# Patient Record
Sex: FEMALE | Race: BLACK OR AFRICAN AMERICAN | NOT HISPANIC OR LATINO | Employment: OTHER | URBAN - METROPOLITAN AREA
[De-identification: names, ages, dates, MRNs, and addresses within clinical notes are randomized per-mention and may not be internally consistent; named-entity substitution may affect disease eponyms.]

---

## 2017-03-29 PROBLEM — F41.9 ANXIETY: Status: ACTIVE | Noted: 2017-03-29

## 2017-03-29 PROBLEM — E11.9 DIABETES (HCC): Status: ACTIVE | Noted: 2017-03-29

## 2017-03-30 ENCOUNTER — HOSPITAL ENCOUNTER (OUTPATIENT)
Dept: GENERAL RADIOLOGY | Facility: HOSPITAL | Age: 62
Discharge: HOME | End: 2017-03-30
Attending: INTERNAL MEDICINE | Admitting: INTERNAL MEDICINE

## 2017-03-30 ENCOUNTER — OFFICE VISIT (OUTPATIENT)
Dept: INTERNAL MEDICINE | Facility: CLINIC | Age: 62
End: 2017-03-30

## 2017-03-30 VITALS
HEIGHT: 70 IN | OXYGEN SATURATION: 96 % | BODY MASS INDEX: 22.51 KG/M2 | SYSTOLIC BLOOD PRESSURE: 95 MMHG | TEMPERATURE: 98.6 F | HEART RATE: 54 BPM | WEIGHT: 157.25 LBS | DIASTOLIC BLOOD PRESSURE: 60 MMHG

## 2017-03-30 DIAGNOSIS — M25.551 PAIN OF RIGHT HIP JOINT: Primary | ICD-10-CM

## 2017-03-30 DIAGNOSIS — M25.551 PAIN OF RIGHT HIP JOINT: ICD-10-CM

## 2017-03-30 DIAGNOSIS — F51.01 PRIMARY INSOMNIA: ICD-10-CM

## 2017-03-30 DIAGNOSIS — R73.9 HYPERGLYCEMIA: Primary | ICD-10-CM

## 2017-03-30 DIAGNOSIS — R00.1 BRADYCARDIA: ICD-10-CM

## 2017-03-30 PROBLEM — E11.9 DIABETES (HCC): Status: RESOLVED | Noted: 2017-03-29 | Resolved: 2017-03-30

## 2017-03-30 PROCEDURE — 73502 X-RAY EXAM HIP UNI 2-3 VIEWS: CPT

## 2017-03-30 PROCEDURE — 93000 ELECTROCARDIOGRAM COMPLETE: CPT | Performed by: INTERNAL MEDICINE

## 2017-03-30 PROCEDURE — 99214 OFFICE O/P EST MOD 30 MIN: CPT | Performed by: INTERNAL MEDICINE

## 2017-03-30 NOTE — PROGRESS NOTES
Subjective  Andrea Soriano is a 61 y.o. female    HPI coming in for follow-up patient with a history of hyperglycemia in the past complains of insomnia is doing reasonably well with trazodone 100 mg daily at bedtime a higher dose gives her daytime somnolence. Has been complaining of right-sided hip pain for about 3 months now symptoms seem to have started after a fall around that time pain with excessive standing and while she is turning in the bed. She still manages to run 2-3 miles 3 days a week    The following portions of the patient's history were reviewed and updated as appropriate: allergies, current medications, past family history, past medical history, past social history, past surgical history, and problem list.     Review of Systems   Constitutional: Negative.  Negative for activity change, appetite change, fatigue and fever.   HENT: Negative for congestion, ear discharge, ear pain and trouble swallowing.    Eyes: Negative for photophobia and visual disturbance.   Respiratory: Negative for cough and shortness of breath.    Cardiovascular: Negative for chest pain and palpitations.   Gastrointestinal: Negative for abdominal distention, abdominal pain, constipation, diarrhea, nausea and vomiting.   Endocrine: Negative.    Genitourinary: Negative for dysuria, hematuria and urgency.   Musculoskeletal: Positive for arthralgias. Negative for back pain, joint swelling and myalgias.        Rt hip pain   Skin: Negative for color change and rash.   Allergic/Immunologic: Negative.    Neurological: Negative for dizziness, weakness, light-headedness and headaches.   Hematological: Negative for adenopathy. Does not bruise/bleed easily.   Psychiatric/Behavioral: Positive for sleep disturbance. Negative for agitation, confusion and dysphoric mood. The patient is nervous/anxious.        Vitals:    03/30/17 1321   BP: 95/60   Pulse: 54   Temp: 98.6 °F (37 °C)   SpO2: 96%       Objective  Physical Exam   Constitutional: She  is oriented to person, place, and time. She appears well-developed and well-nourished. No distress.   HENT:   Nose: Nose normal.   Mouth/Throat: Oropharynx is clear and moist.   Eyes: Conjunctivae and EOM are normal. No scleral icterus.   Neck: No tracheal deviation present. No thyromegaly present.   Cardiovascular: Regular rhythm.  Exam reveals no friction rub.    No murmur heard.  Pulmonary/Chest: No respiratory distress. She has no wheezes. She has no rales.   Abdominal: Soft. She exhibits no distension and no mass. There is no tenderness. There is no guarding.   Musculoskeletal: Normal range of motion. She exhibits tenderness. She exhibits no deformity.   Painful rt hip int. rotation   Lymphadenopathy:     She has no cervical adenopathy.   Neurological: She is alert and oriented to person, place, and time. She has normal reflexes. No cranial nerve deficit. Coordination normal.   Skin: Skin is warm and dry. No rash noted. No erythema.   Psychiatric: She has a normal mood and affect. Her behavior is normal. Judgment and thought content normal.       Diagnoses and all orders for this visit:    Hyperglycemia discussed dietary restrictions check A1c    Primary insomnia counseled about sleep hygiene continue with trazodone    Pain of right hip joint suspect DJD of her hip joint check x-ray. Ortho [facility] referral if needed    Bradycardia noted on routine evaluation without lightheadedness dizziness no history of syncope EKG done today. Findings below. Since she is asymptomatic will continue to observe    ECG 12 Lead  Date/Time: 3/30/2017 5:37 PM  Performed by: MARCOS PIZANO  Authorized by: MARCOS PIZANO   Rhythm: sinus rhythm and sinus bradycardia  Rate: bradycardic  Conduction: conduction normal  ST Segments: ST segments normal  T Waves: T waves normal  Other: no other findings

## 2017-03-31 LAB
ALBUMIN SERPL-MCNC: 4.2 G/DL (ref 3.5–5)
ALBUMIN/GLOB SERPL: 1.3 G/DL (ref 1–2)
ALP SERPL-CCNC: 88 U/L (ref 38–126)
ALT SERPL-CCNC: 29 U/L (ref 13–69)
AST SERPL-CCNC: 27 U/L (ref 15–46)
BILIRUB SERPL-MCNC: 0.7 MG/DL (ref 0.2–1.3)
BUN SERPL-MCNC: 15 MG/DL (ref 7–20)
BUN/CREAT SERPL: 18.8 (ref 7.1–23.5)
CALCIUM SERPL-MCNC: 9.5 MG/DL (ref 8.4–10.2)
CHLORIDE SERPL-SCNC: 104 MMOL/L (ref 98–107)
CO2 SERPL-SCNC: 29 MMOL/L (ref 26–30)
CREAT SERPL-MCNC: 0.8 MG/DL (ref 0.6–1.3)
GLOBULIN SER CALC-MCNC: 3.3 GM/DL
GLUCOSE SERPL-MCNC: 96 MG/DL (ref 74–98)
HBA1C MFR BLD: 5.9 %
POTASSIUM SERPL-SCNC: 4.2 MMOL/L (ref 3.5–5.1)
PROT SERPL-MCNC: 7.5 G/DL (ref 6.3–8.2)
SODIUM SERPL-SCNC: 141 MMOL/L (ref 137–145)
TSH SERPL DL<=0.005 MIU/L-ACNC: 0.91 MIU/ML (ref 0.47–4.68)

## 2017-04-10 ENCOUNTER — OFFICE VISIT (OUTPATIENT)
Dept: ORTHOPEDIC SURGERY | Facility: CLINIC | Age: 62
End: 2017-04-10

## 2017-04-10 VITALS — BODY MASS INDEX: 22.48 KG/M2 | HEIGHT: 70 IN | RESPIRATION RATE: 17 BRPM | WEIGHT: 157 LBS

## 2017-04-10 DIAGNOSIS — M16.11 PRIMARY OSTEOARTHRITIS OF RIGHT HIP: Primary | ICD-10-CM

## 2017-04-10 PROCEDURE — 99202 OFFICE O/P NEW SF 15 MIN: CPT | Performed by: ORTHOPAEDIC SURGERY

## 2017-04-10 NOTE — PROGRESS NOTES
Subjective   Patient ID: Andrea Soriano is a 61 y.o. right hand dominant female is being seen for orthopaedic evaluation today for right hip pain. Pain and Consult of the Right Hip (Patient is being seen today at the request of Dr. Carpio.)         Pain   This is a chronic ( And she also reports two episodes of falling while jogging in August then October of 2016.) problem. The current episode started more than 1 year ago. The problem occurs intermittently. The problem has been waxing and waning (although for the past two weeks her hip has been improving..). Associated symptoms include arthralgias. Pertinent negatives include no abdominal pain, chest pain, fever, joint swelling or rash. The symptoms are aggravated by stress (jogging along rocks on her path, postional pain lying on the right side.). She has tried heat and rest (epsom salts,) for the symptoms. The treatment provided moderate (And she continues able to jog 5 miles 5 times a week.) relief.       Pain Score: 5  Pain Location: Hip  Pain Orientation: Right  Pain Descriptors: Aching  Pain Frequency: Intermittent  Date Pain First Started:  (started last year )  Aggravating Factors:  (laying on the right side)  Pain Intervention(s): Heat applied (soaking in the tub)  Result of Injury: Yes (she states she had a bad fall jogging)  Work-Related Injury: No    Past Medical History:   Diagnosis Date   • Eczema    • Hemorrhoid    • Insomnia         Past Surgical History:   Procedure Laterality Date   • HYSTERECTOMY         Family History   Problem Relation Age of Onset   • Family history unknown: Yes       Social History     Social History   • Marital status:      Spouse name: N/A   • Number of children: N/A   • Years of education: N/A     Occupational History   • retired       Social History Main Topics   • Smoking status: Never Smoker   • Smokeless tobacco: Not on file   • Alcohol use Yes      Comment: occassional   • Drug use: No   • Sexual  "activity: Defer     Other Topics Concern   • Not on file     Social History Narrative       No Known Allergies    Review of Systems   Constitutional: Negative for fever.   HENT: Negative for voice change.    Eyes: Negative for visual disturbance.   Respiratory: Negative for shortness of breath.    Cardiovascular: Negative for chest pain.   Gastrointestinal: Negative for abdominal distention and abdominal pain.   Genitourinary: Negative for dysuria.   Musculoskeletal: Positive for arthralgias. Negative for gait problem and joint swelling.   Skin: Negative for rash.   Neurological: Negative for speech difficulty.   Hematological: Does not bruise/bleed easily.   Psychiatric/Behavioral: Negative for confusion.       Objective   Resp 17  Ht 69.5\" (176.5 cm)  Wt 157 lb (71.2 kg)  BMI 22.85 kg/m2   Physical Exam   Constitutional: She appears well-developed. No distress.   Neurological: She is alert. Gait normal.   Skin: Skin is warm and dry. No rash noted. No erythema.   Psychiatric: She has a normal mood and affect. Her speech is normal.   Vitals reviewed.    Right Hip Exam     Tenderness   The patient is experiencing tenderness in the anterior and posterior.    Range of Motion   Flexion: 100   Internal Rotation: 10   External Rotation: 20   Abduction: 30     Muscle Strength   Abduction: 5/5   Adduction: 5/5   Flexion: 5/5     Tests   JEFF: negative    Other   Erythema: absent  Sensation: normal  Pulse: present      Back Exam     Tenderness   The patient is experiencing no tenderness.     Muscle Strength   Right Quadriceps:  5/5   Left Quadriceps:  5/5   Right Hamstrings:  5/5   Left Hamstrings:  5/5     Tests   Straight leg raise right: negative  Straight leg raise left: negative        Extremity DVT signs are Negative on physical exam with negative Atiilo sign, with no calf pain, with no palpable cords and with no skin tone change   Neurologic Exam     Mental Status   Attention: normal.   Speech: speech is normal "   Level of consciousness: alert  Knowledge: good.     Motor Exam   Overall muscle tone: normal    Strength   Right quadriceps: 5/5  Left quadriceps: 5/5  Right hamstrin/5  Left hamstrin/5    Gait, Coordination, and Reflexes     Gait  Gait: normal     Ortho Exam     Assessment/Plan   Independent Review of Radiographic Studies:    Indication to evaluate right hip pain and with prior comparison views, shows no acute fracture or disclocation evident.  Indication to evaluate right hip joint condition, and compared with prior images, shows evident chronic advanced osteoarthritis.  No major changes from prior  2012 hip radiographs.  Laboratory and Other Studies:  No new results reviewed today.   Medical Decision Making:    Ongoing with residual symptoms.  Continue current management and any additional treatments and workup as outlined in plan.    Procedures  [x] No procedures were performed in office today.     Andrea was seen today for pain and consult.    Diagnoses and all orders for this visit:    Primary osteoarthritis of right hip       Regular exercise as tolerated  Orthopedic activities reviewed and patient expressed appreciation  Discussion of orthopedic goals  Risk, benefits, and merits of treatment alternatives reviewed with the patient and questions answered   We reviewed exercise and jogging methods and surfaces to decrease shock on the hip and joints.  Discussed options including elective right hip joint corticosteroid injeciton under flouroscopy and for intractable pain and functional limitations, the option of left hip replacement.    Recommendations/Plan:  Exercise, medications, injections, other patient advice, and return appointment as noted.  Brace: No brace was given at today's visit  Referral: No referrals made at today's visit  Test/Studies: No additional studies ordered.  Surgery: No surgery proposed at this visit. and For intractable painful limiting condition, patient to consider elective  surgical options.  Work/Activity Status: May perform usual activities as tolerated  Patient is encouraged to call or return for any issues or concerns.    Return if symptoms worsen or fail to improve.  Patient agreeable to call or return sooner for any concerns.

## 2017-04-27 ENCOUNTER — TELEPHONE (OUTPATIENT)
Dept: INTERNAL MEDICINE | Facility: CLINIC | Age: 62
End: 2017-04-27

## 2017-04-27 NOTE — TELEPHONE ENCOUNTER
----- Message from [place] sent at 4/27/2017 10:15 AM EDT -----  Contact: Patient  Patient cancelled her appt for this morning because she saw a different Dr about her hip. She is getting cortisone injections in it and she wants to know if she should come see Dr. Carpio after she gets the injections.    She'd like a callback.

## 2017-05-19 ENCOUNTER — HOSPITAL ENCOUNTER (OUTPATIENT)
Dept: GENERAL RADIOLOGY | Facility: HOSPITAL | Age: 62
Discharge: HOME | End: 2017-05-19
Admitting: PHYSICIAN ASSISTANT

## 2017-05-19 PROCEDURE — 77002 NEEDLE LOCALIZATION BY XRAY: CPT

## 2017-05-19 PROCEDURE — 20610 DRAIN/INJ JOINT/BURSA W/O US: CPT | Performed by: ORTHOPAEDIC SURGERY

## 2017-05-19 PROCEDURE — 77002 NEEDLE LOCALIZATION BY XRAY: CPT | Performed by: ORTHOPAEDIC SURGERY

## 2017-05-19 PROCEDURE — 25010000002 METHYLPREDNISOLONE PER 80 MG: Performed by: ORTHOPAEDIC SURGERY

## 2017-05-19 RX ORDER — LIDOCAINE HYDROCHLORIDE 10 MG/ML
20 INJECTION, SOLUTION INFILTRATION; PERINEURAL ONCE
Status: COMPLETED | OUTPATIENT
Start: 2017-05-19 | End: 2017-05-19

## 2017-05-19 RX ORDER — METHYLPREDNISOLONE ACETATE 80 MG/ML
80 INJECTION, SUSPENSION INTRA-ARTICULAR; INTRALESIONAL; INTRAMUSCULAR; SOFT TISSUE ONCE
Status: COMPLETED | OUTPATIENT
Start: 2017-05-19 | End: 2017-05-19

## 2017-05-19 RX ADMIN — LIDOCAINE HYDROCHLORIDE 9 ML: 10 INJECTION, SOLUTION INFILTRATION; PERINEURAL at 16:15

## 2017-05-19 RX ADMIN — METHYLPREDNISOLONE ACETATE 80 MG: 80 INJECTION, SUSPENSION INTRA-ARTICULAR; INTRALESIONAL; INTRAMUSCULAR; SOFT TISSUE at 15:40

## 2017-06-05 ENCOUNTER — OFFICE VISIT (OUTPATIENT)
Dept: INTERNAL MEDICINE | Facility: CLINIC | Age: 62
End: 2017-06-05

## 2017-06-05 VITALS
TEMPERATURE: 98.2 F | SYSTOLIC BLOOD PRESSURE: 105 MMHG | OXYGEN SATURATION: 98 % | DIASTOLIC BLOOD PRESSURE: 70 MMHG | BODY MASS INDEX: 22.78 KG/M2 | HEART RATE: 48 BPM | HEIGHT: 70 IN | WEIGHT: 159.13 LBS

## 2017-06-05 DIAGNOSIS — M25.512 NONTRAUMATIC SHOULDER PAIN, LEFT: Primary | ICD-10-CM

## 2017-06-05 DIAGNOSIS — F51.01 PRIMARY INSOMNIA: ICD-10-CM

## 2017-06-05 DIAGNOSIS — G25.89 SCAPULAR DYSKINESIS: ICD-10-CM

## 2017-06-05 PROCEDURE — 99213 OFFICE O/P EST LOW 20 MIN: CPT | Performed by: INTERNAL MEDICINE

## 2017-06-05 RX ORDER — CYCLOBENZAPRINE HCL 5 MG
5 TABLET ORAL 3 TIMES DAILY PRN
Qty: 10 TABLET | Refills: 0 | Status: SHIPPED | OUTPATIENT
Start: 2017-06-05 | End: 2017-06-16 | Stop reason: SDUPTHER

## 2017-06-05 RX ORDER — NAPROXEN 500 MG/1
500 TABLET ORAL 2 TIMES DAILY WITH MEALS
Qty: 20 TABLET | Refills: 0 | Status: SHIPPED | OUTPATIENT
Start: 2017-06-05 | End: 2017-06-16 | Stop reason: SDUPTHER

## 2017-06-05 NOTE — PROGRESS NOTES
Subjective   Andrea Soriano is a 61 y.o. female.     History of Present Illness  Here for left, non-radiating, shoulder pain which she describes as feeling like a muscle knot.  The pain is located on the medial aspect of her left scapula. The pain began 3 days ago upon waking and she thought it might be a result of the way she slept.  The pain is worsened by stretching the area or by deep breathing.  The pain is somewhat relieved by resting the area.  She has tried NSAIDs and soaking in warm water with minimal relief.  She denies injury, chest pain, dyspnea, cough, rash or changes in the skin.  She reports being very active, running 6 miles 5 days a week, and does not recall lifting anything that may have aggravated her muscles.     The following portions of the patient's history were reviewed and updated as appropriate: allergies, current medications, past family history, past medical history, past social history, past surgical history and problem list.    Review of Systems   Constitutional: Negative.  Negative for activity change, appetite change, fatigue, fever and unexpected weight change.   HENT: Negative for congestion, ear discharge, ear pain and trouble swallowing.    Eyes: Negative for photophobia and visual disturbance.   Respiratory: Negative for cough, chest tightness, shortness of breath and wheezing.    Cardiovascular: Negative for chest pain, palpitations and leg swelling.   Gastrointestinal: Negative for abdominal distention, abdominal pain, constipation, diarrhea, nausea and vomiting.   Endocrine: Negative.    Genitourinary: Negative for dysuria, hematuria and urgency.   Musculoskeletal: Negative for arthralgias, joint swelling and myalgias.        Left shoulder pain   Skin: Negative for color change and rash.   Allergic/Immunologic: Negative.    Neurological: Negative for dizziness, weakness, light-headedness and headaches.   Hematological: Negative for adenopathy. Does not bruise/bleed easily.    Psychiatric/Behavioral: Positive for sleep disturbance. Negative for agitation, confusion and dysphoric mood. The patient is not nervous/anxious.        Objective   Physical Exam   Constitutional: She is oriented to person, place, and time. She appears well-developed and well-nourished. No distress.   HENT:   Head: Normocephalic.   Nose: Nose normal.   Mouth/Throat: Oropharynx is clear and moist.   Eyes: Conjunctivae and EOM are normal. No scleral icterus.   Neck: Normal range of motion. Neck supple. No tracheal deviation present. No thyromegaly present.   Cardiovascular: Regular rhythm, normal heart sounds and intact distal pulses.  Exam reveals no friction rub.    No murmur heard.  Bradycardic    Pulmonary/Chest: Effort normal and breath sounds normal. No respiratory distress. She has no wheezes. She has no rales.   Abdominal: Soft. Bowel sounds are normal. She exhibits no distension and no mass. There is no tenderness. There is no guarding.   Musculoskeletal: Normal range of motion. She exhibits tenderness. She exhibits no deformity.   Shoulder ROM intact bilaterally.  Tender to palpation of medial aspect of the left scapula and over the area of T3   Lymphadenopathy:     She has no cervical adenopathy.   Neurological: She is alert and oriented to person, place, and time. She has normal reflexes. No cranial nerve deficit. Coordination normal.   Skin: Skin is warm and dry. No rash noted. No erythema.   No rashes or lesions noted around the area of her left scapula    Psychiatric: She has a normal mood and affect. Her behavior is normal. Judgment and thought content normal.   Nursing note and vitals reviewed.      Assessment/Plan     Nontraumatic shoulder pain, left - Likely due to muscle strain.  Will initiate regular use of naproxen and flexeril as needed.  Continue with heat and stretching.  Return to clinic or call if symptoms persist or worsen or if rash develops.    -     naproxen (NAPROSYN) 500 MG tablet;  Take 1 tablet by mouth 2 (Two) Times a Day With Meals.  -     cyclobenzaprine (FLEXERIL) 5 MG tablet; Take 1 tablet by mouth 3 (Three) Times a Day As Needed for Muscle Spasms.    Scapular dyskinesis - Will initiate regular use of naproxen and flexeril as needed.  Continue with heat and stretching.  Return to clinic or call if symptoms persist or worsen or if rash develops.    -     naproxen (NAPROSYN) 500 MG tablet; Take 1 tablet by mouth 2 (Two) Times a Day With Meals.  -     cyclobenzaprine (FLEXERIL) 5 MG tablet; Take 1 tablet by mouth 3 (Three) Times a Day As Needed for Muscle Spasms.    Primary insomnia - Well controlled with nightly trazodone.  Continue with good sleep hygiene and trazodone.           This encounter was scribed by CARLITA Armstrong, as dictated by my teaching physician, Dr. Lonnie Carpio, in his presence.   1.  This note accurately reflects work and decisions made by me.

## 2017-06-16 ENCOUNTER — TELEPHONE (OUTPATIENT)
Dept: INTERNAL MEDICINE | Facility: CLINIC | Age: 62
End: 2017-06-16

## 2017-06-16 RX ORDER — CYCLOBENZAPRINE HCL 5 MG
5 TABLET ORAL 3 TIMES DAILY PRN
Qty: 10 TABLET | Refills: 0 | Status: SHIPPED | OUTPATIENT
Start: 2017-06-16 | End: 2018-01-22 | Stop reason: SDUPTHER

## 2017-06-16 RX ORDER — NAPROXEN 500 MG/1
500 TABLET ORAL 2 TIMES DAILY WITH MEALS
Qty: 20 TABLET | Refills: 0 | Status: SHIPPED | OUTPATIENT
Start: 2017-06-16 | End: 2018-01-22 | Stop reason: SDUPTHER

## 2017-06-16 NOTE — TELEPHONE ENCOUNTER
Patient is still having back pain and is requesting a refill of both medicines Dr. Carpio prescribed her.

## 2018-01-22 ENCOUNTER — OFFICE VISIT (OUTPATIENT)
Dept: INTERNAL MEDICINE | Facility: CLINIC | Age: 63
End: 2018-01-22

## 2018-01-22 VITALS
HEIGHT: 69 IN | HEART RATE: 58 BPM | TEMPERATURE: 96.4 F | BODY MASS INDEX: 24.73 KG/M2 | OXYGEN SATURATION: 98 % | SYSTOLIC BLOOD PRESSURE: 120 MMHG | DIASTOLIC BLOOD PRESSURE: 60 MMHG | WEIGHT: 167 LBS

## 2018-01-22 DIAGNOSIS — M54.50 ACUTE MIDLINE LOW BACK PAIN WITHOUT SCIATICA: Primary | ICD-10-CM

## 2018-01-22 PROCEDURE — 99213 OFFICE O/P EST LOW 20 MIN: CPT | Performed by: PHYSICIAN ASSISTANT

## 2018-01-22 RX ORDER — CYCLOBENZAPRINE HCL 5 MG
5 TABLET ORAL 3 TIMES DAILY PRN
Qty: 15 TABLET | Refills: 0 | Status: SHIPPED | OUTPATIENT
Start: 2018-01-22 | End: 2018-03-26

## 2018-01-22 RX ORDER — NAPROXEN 500 MG/1
500 TABLET ORAL 2 TIMES DAILY WITH MEALS
Qty: 20 TABLET | Refills: 0 | Status: SHIPPED | OUTPATIENT
Start: 2018-01-22 | End: 2018-01-26 | Stop reason: ALTCHOICE

## 2018-01-22 NOTE — PROGRESS NOTES
"Subjective     Chief Complaint: back pain    History of Present Illness     Andrea Soriano is a 62 y.o. female presenting complaints of lower back pain, worse on her right side ×4-5 days.  States she may have overdid it several days ago while cleaning, vacuuming states she lifted a heavy bucket of water and may have strained her back.  She's tried alternating Tylenol and Motrin at home with little relief.  She is also tried sleeping with a heating pad, icing the area, Epsom salt.  Hurts worse with twisting. States she is still running several miles a day.  Denies numbness, tingling, burning, denies pain radiating down leg. Denies issues with bowel or bladder.    She also has chronic right hip pain for which she receives steroid injections.  She is unsure if she strained her back while cleaning or if this may be related to the hip, as she is due for another hip injection soon.    The following portions of the patient's history were reviewed and updated as appropriate: current medications, allergies, PMH.    Review of Systems   Musculoskeletal: Positive for arthralgias, back pain and myalgias. Negative for joint swelling, neck pain and neck stiffness.     Objective     Vitals:    01/22/18 1402   BP: 120/60   Pulse: 58   Temp: 96.4 °F (35.8 °C)   SpO2: 98%   Weight: 75.8 kg (167 lb)   Height: 176.5 cm (69.49\")       Physical Exam   Constitutional: She appears well-developed and well-nourished.   Musculoskeletal:        Right hip: She exhibits decreased range of motion, decreased strength and tenderness. She exhibits no deformity.        Lumbar back: She exhibits decreased range of motion, tenderness and pain. She exhibits no deformity.      Assessment/Plan     Diagnoses and all orders for this visit:    Acute midline low back pain without sciatica  -     naproxen (NAPROSYN) 500 MG tablet; Take 1 tablet by mouth 2 (Two) Times a Day With Meals.  -     cyclobenzaprine (FLEXERIL) 5 MG tablet; Take 1 tablet by mouth 3 " (Three) Times a Day As Needed for Muscle Spasms.    Rest, naproxen as needed with food, may use flexeril to help her rest at night.  RTC if pain does not resolve in 2 weeks.    Tricia Hanks PA-C  01/22/2018         Please note that portions of this note were completed with a voice recognition program. Efforts were made to edit dictation, but occasionally words are mistranscribed.

## 2018-01-25 DIAGNOSIS — M25.551 RIGHT HIP PAIN: Primary | ICD-10-CM

## 2018-03-26 ENCOUNTER — OFFICE VISIT (OUTPATIENT)
Dept: INTERNAL MEDICINE | Facility: CLINIC | Age: 63
End: 2018-03-26

## 2018-03-26 VITALS
HEART RATE: 58 BPM | OXYGEN SATURATION: 99 % | BODY MASS INDEX: 25.03 KG/M2 | WEIGHT: 169 LBS | DIASTOLIC BLOOD PRESSURE: 70 MMHG | TEMPERATURE: 97.9 F | HEIGHT: 69 IN | SYSTOLIC BLOOD PRESSURE: 130 MMHG

## 2018-03-26 DIAGNOSIS — M25.551 PAIN OF RIGHT HIP JOINT: ICD-10-CM

## 2018-03-26 DIAGNOSIS — F51.01 PRIMARY INSOMNIA: ICD-10-CM

## 2018-03-26 DIAGNOSIS — E55.9 VITAMIN D DEFICIENCY: ICD-10-CM

## 2018-03-26 DIAGNOSIS — M54.50 CHRONIC MIDLINE LOW BACK PAIN WITHOUT SCIATICA: Primary | ICD-10-CM

## 2018-03-26 DIAGNOSIS — G89.29 CHRONIC MIDLINE LOW BACK PAIN WITHOUT SCIATICA: Primary | ICD-10-CM

## 2018-03-26 LAB
25(OH)D3+25(OH)D2 SERPL-MCNC: 27.9 NG/ML
ALBUMIN SERPL-MCNC: 4.2 G/DL (ref 3.5–5)
ALBUMIN/GLOB SERPL: 1.3 G/DL (ref 1–2)
ALP SERPL-CCNC: 102 U/L (ref 38–126)
ALT SERPL-CCNC: 40 U/L (ref 13–69)
AST SERPL-CCNC: 25 U/L (ref 15–46)
BILIRUB SERPL-MCNC: 0.4 MG/DL (ref 0.2–1.3)
BUN SERPL-MCNC: 18 MG/DL (ref 7–20)
BUN/CREAT SERPL: 25.7 (ref 7.1–23.5)
CALCIUM SERPL-MCNC: 10.1 MG/DL (ref 8.4–10.2)
CHLORIDE SERPL-SCNC: 104 MMOL/L (ref 98–107)
CO2 SERPL-SCNC: 27 MMOL/L (ref 26–30)
CREAT SERPL-MCNC: 0.7 MG/DL (ref 0.6–1.3)
GFR SERPLBLD CREATININE-BSD FMLA CKD-EPI: 103 ML/MIN/1.73
GFR SERPLBLD CREATININE-BSD FMLA CKD-EPI: 85 ML/MIN/1.73
GLOBULIN SER CALC-MCNC: 3.3 GM/DL
GLUCOSE SERPL-MCNC: 130 MG/DL (ref 74–98)
POTASSIUM SERPL-SCNC: 4.4 MMOL/L (ref 3.5–5.1)
PROT SERPL-MCNC: 7.5 G/DL (ref 6.3–8.2)
SODIUM SERPL-SCNC: 145 MMOL/L (ref 137–145)

## 2018-03-26 PROCEDURE — 99214 OFFICE O/P EST MOD 30 MIN: CPT | Performed by: INTERNAL MEDICINE

## 2018-03-26 NOTE — PROGRESS NOTES
"Subjective  Andrea Soriano is a 62 y.o. female    HPI coming in for follow-up patient with right hip pain workup in the past has shown evidence of severe DJD has complains of low back pain without radiation down her legs complains of insomnia. Social drinker no tobacco use no new trauma uses NSAIDs on a when necessary basis    The following portions of the patient's history were reviewed and updated as appropriate: allergies, current medications, past family history, past medical history, past social history, past surgical history, and problem list.     Review of Systems   Constitutional: Negative.  Negative for activity change, appetite change, fatigue and fever.   HENT: Negative for congestion, ear discharge, ear pain and trouble swallowing.    Eyes: Negative for photophobia and visual disturbance.   Respiratory: Negative for cough and shortness of breath.    Cardiovascular: Negative for chest pain and palpitations.   Gastrointestinal: Negative for abdominal distention, abdominal pain, constipation, diarrhea, nausea and vomiting.   Endocrine: Negative.    Genitourinary: Negative for dysuria, hematuria and urgency.   Musculoskeletal: Positive for arthralgias. Negative for back pain, joint swelling and myalgias.   Skin: Negative for color change and rash.   Allergic/Immunologic: Negative.    Neurological: Negative for dizziness, weakness, light-headedness and headaches.   Hematological: Negative for adenopathy. Does not bruise/bleed easily.   Psychiatric/Behavioral: Positive for sleep disturbance. Negative for agitation, confusion and dysphoric mood. The patient is not nervous/anxious.        Visit Vitals  /70   Pulse 58   Temp 97.9 °F (36.6 °C)   Ht 176.5 cm (69.49\")   Wt 76.7 kg (169 lb)   SpO2 99%   BMI 24.61 kg/m²       Objective  Physical Exam   Constitutional: She is oriented to person, place, and time. She appears well-developed and well-nourished. No distress.   HENT:   Nose: Nose normal. "   Mouth/Throat: Oropharynx is clear and moist.   Eyes: Conjunctivae and EOM are normal. No scleral icterus.   Neck: No tracheal deviation present. No thyromegaly present.   Cardiovascular: Normal rate and regular rhythm.  Exam reveals no friction rub.    No murmur heard.  Pulmonary/Chest: No respiratory distress. She has no wheezes. She has no rales.   Abdominal: Soft. She exhibits no distension and no mass. There is no tenderness. There is no guarding.   Musculoskeletal: Normal range of motion. She exhibits no deformity.   Lymphadenopathy:     She has no cervical adenopathy.   Neurological: She is alert and oriented to person, place, and time. She has normal reflexes. No cranial nerve deficit. Coordination normal.   Skin: Skin is warm and dry. No rash noted. No erythema.   Psychiatric: She has a normal mood and affect. Her behavior is normal. Judgment and thought content normal.       Diagnoses and all orders for this visit:    Chronic midline low back pain without sciatica stable continue with home exercises    Primary insomnia. Stable on trazodone at nighttime counseled about sleep hygiene    Pain of right hip joint. X-ray report reviewed discussed with patient may need arthroplasty in the future currently continues to run up to 5 miles a day has occasional pain at nighttime continue with conservative measures with meloxicam

## 2018-05-30 DIAGNOSIS — M16.11 PRIMARY OSTEOARTHRITIS OF RIGHT HIP: ICD-10-CM

## 2018-05-30 RX ORDER — MELOXICAM 15 MG/1
15 TABLET ORAL DAILY
Qty: 30 TABLET | Refills: 1 | Status: SHIPPED | OUTPATIENT
Start: 2018-05-30 | End: 2018-08-08 | Stop reason: SDUPTHER

## 2018-08-08 DIAGNOSIS — M16.11 PRIMARY OSTEOARTHRITIS OF RIGHT HIP: ICD-10-CM

## 2018-08-08 RX ORDER — MELOXICAM 15 MG/1
TABLET ORAL
Qty: 30 TABLET | Refills: 1 | Status: SHIPPED | OUTPATIENT
Start: 2018-08-08 | End: 2018-10-18 | Stop reason: SDUPTHER

## 2018-08-29 RX ORDER — NAPROXEN 500 MG/1
TABLET ORAL
Qty: 20 TABLET | Refills: 2 | Status: SHIPPED | OUTPATIENT
Start: 2018-08-29 | End: 2018-10-01

## 2018-10-01 ENCOUNTER — OFFICE VISIT (OUTPATIENT)
Dept: INTERNAL MEDICINE | Facility: CLINIC | Age: 63
End: 2018-10-01

## 2018-10-01 VITALS
WEIGHT: 160 LBS | OXYGEN SATURATION: 98 % | TEMPERATURE: 97.1 F | SYSTOLIC BLOOD PRESSURE: 160 MMHG | BODY MASS INDEX: 23.7 KG/M2 | HEIGHT: 69 IN | HEART RATE: 50 BPM | DIASTOLIC BLOOD PRESSURE: 70 MMHG

## 2018-10-01 DIAGNOSIS — M25.511 CHRONIC RIGHT SHOULDER PAIN: ICD-10-CM

## 2018-10-01 DIAGNOSIS — Z23 NEED FOR VACCINATION: Primary | ICD-10-CM

## 2018-10-01 DIAGNOSIS — G89.29 CHRONIC RIGHT SHOULDER PAIN: ICD-10-CM

## 2018-10-01 DIAGNOSIS — F51.01 PRIMARY INSOMNIA: ICD-10-CM

## 2018-10-01 DIAGNOSIS — I10 HTN (HYPERTENSION), BENIGN: ICD-10-CM

## 2018-10-01 PROCEDURE — 99214 OFFICE O/P EST MOD 30 MIN: CPT | Performed by: INTERNAL MEDICINE

## 2018-10-01 PROCEDURE — 90674 CCIIV4 VAC NO PRSV 0.5 ML IM: CPT | Performed by: INTERNAL MEDICINE

## 2018-10-01 PROCEDURE — 90471 IMMUNIZATION ADMIN: CPT | Performed by: INTERNAL MEDICINE

## 2018-10-01 RX ORDER — HYDROCHLOROTHIAZIDE 12.5 MG/1
12.5 CAPSULE, GELATIN COATED ORAL DAILY
Qty: 90 CAPSULE | Refills: 3 | Status: SHIPPED | OUTPATIENT
Start: 2018-10-01 | End: 2019-12-02 | Stop reason: SDUPTHER

## 2018-10-01 NOTE — PROGRESS NOTES
"Subjective  Andrea Woody is a 63 y.o. female    HPI coming in for follow-up has hypertension complains of right shoulder discomfort especially with overhead activities no new trauma discomfort ongoing for many months now. Denies upper extremity weakness or numbness has complains of anxiety with difficulty staying asleep. Denies significant alcohol use exercises regularly    The following portions of the patient's history were reviewed and updated as appropriate: allergies, current medications, past family history, past medical history, past social history, past surgical history, and problem list.     Review of Systems   Constitutional: Negative.  Negative for activity change, appetite change, fatigue and fever.   HENT: Negative for congestion, ear discharge, ear pain and trouble swallowing.    Eyes: Negative for photophobia and visual disturbance.   Respiratory: Negative for cough and shortness of breath.    Cardiovascular: Negative for chest pain and palpitations.   Gastrointestinal: Negative for abdominal distention, abdominal pain, constipation, diarrhea, nausea and vomiting.   Endocrine: Negative.    Genitourinary: Negative for dysuria, hematuria and urgency.   Musculoskeletal: Positive for arthralgias. Negative for back pain, joint swelling and myalgias.        Rt. Shoulder pain   Skin: Negative for color change and rash.   Allergic/Immunologic: Negative.    Neurological: Negative for dizziness, weakness, light-headedness and headaches.   Hematological: Negative for adenopathy. Does not bruise/bleed easily.   Psychiatric/Behavioral: Negative for agitation, confusion and dysphoric mood. The patient is not nervous/anxious.        Visit Vitals  /70   Pulse 50   Temp 97.1 °F (36.2 °C)   Ht 176.5 cm (69.49\")   Wt 72.6 kg (160 lb)   SpO2 98%   BMI 23.30 kg/m²       Objective  Physical Exam   Constitutional: She is oriented to person, place, and time. She appears well-developed and well-nourished. No distress. "   HENT:   Nose: Nose normal.   Mouth/Throat: Oropharynx is clear and moist.   Eyes: Conjunctivae and EOM are normal. No scleral icterus.   Neck: No tracheal deviation present. No thyromegaly present.   Cardiovascular: Normal rate and regular rhythm.  Exam reveals no friction rub.    No murmur heard.  Pulmonary/Chest: No respiratory distress. She has no wheezes. She has no rales.   Abdominal: Soft. She exhibits no distension and no mass. There is no tenderness. There is no guarding.   Musculoskeletal: Normal range of motion. She exhibits no deformity.   Rt shoulder painful extension, abduction   Lymphadenopathy:     She has no cervical adenopathy.   Neurological: She is alert and oriented to person, place, and time. She has normal reflexes. No cranial nerve deficit. Coordination normal.   Skin: Skin is warm and dry. No rash noted. No erythema.   Psychiatric: She has a normal mood and affect. Her behavior is normal. Judgment and thought content normal.       Diagnoses and all orders for this visit:    Need for vaccination  -     Flucelvax Quad (Vial) =>4 yrs ([id number])    HTN (hypertension), benign stable with current meds and low-salt diet    Chronic right shoulder pain discussed home exercises PT referral also made suspect impingement syndrome    Primary insomnia counseled about sleep hygiene trazodone when necessary

## 2018-10-18 DIAGNOSIS — M16.11 PRIMARY OSTEOARTHRITIS OF RIGHT HIP: ICD-10-CM

## 2018-10-18 RX ORDER — MELOXICAM 15 MG/1
TABLET ORAL
Qty: 90 TABLET | Refills: 3 | Status: SHIPPED | OUTPATIENT
Start: 2018-10-18 | End: 2019-04-18

## 2018-11-02 ENCOUNTER — OFFICE VISIT (OUTPATIENT)
Dept: INTERNAL MEDICINE | Facility: CLINIC | Age: 63
End: 2018-11-02

## 2018-11-02 VITALS
WEIGHT: 157 LBS | DIASTOLIC BLOOD PRESSURE: 70 MMHG | HEIGHT: 69 IN | BODY MASS INDEX: 23.25 KG/M2 | OXYGEN SATURATION: 98 % | SYSTOLIC BLOOD PRESSURE: 140 MMHG | TEMPERATURE: 97.5 F | HEART RATE: 47 BPM

## 2018-11-02 DIAGNOSIS — L98.9 LESION OF NECK: Primary | ICD-10-CM

## 2018-11-02 DIAGNOSIS — R00.1 BRADYCARDIA: ICD-10-CM

## 2018-11-02 PROCEDURE — 99213 OFFICE O/P EST LOW 20 MIN: CPT | Performed by: INTERNAL MEDICINE

## 2018-11-02 PROCEDURE — 93000 ELECTROCARDIOGRAM COMPLETE: CPT | Performed by: INTERNAL MEDICINE

## 2018-11-02 NOTE — PROGRESS NOTES
"Subjective  Andrea Woody is a 63 y.o. female    HPI coming in for evaluation of for soft nontender lesion on the nape of her neck has been present for a few years but appears to have increase in size. She wants this removed. Has hypertension bilateral hip pain for which she is on NSAIDs when necessary.    The following portions of the patient's history were reviewed and updated as appropriate: allergies, current medications, past family history, past medical history, past social history, past surgical history, and problem list.     Review of Systems   Constitutional: Negative.  Negative for activity change, appetite change, fatigue and fever.   HENT: Negative for congestion, ear discharge, ear pain and trouble swallowing.    Eyes: Negative for photophobia and visual disturbance.   Respiratory: Negative for cough and shortness of breath.    Cardiovascular: Negative for chest pain and palpitations.   Gastrointestinal: Negative for abdominal distention, abdominal pain, constipation, diarrhea, nausea and vomiting.   Endocrine: Negative.    Genitourinary: Negative for dysuria, hematuria and urgency.   Musculoskeletal: Positive for arthralgias. Negative for back pain, joint swelling and myalgias.   Skin: Negative for color change and rash.   Allergic/Immunologic: Negative.    Neurological: Negative for dizziness, weakness, light-headedness and headaches.   Hematological: Negative for adenopathy. Does not bruise/bleed easily.   Psychiatric/Behavioral: Negative for agitation, confusion and dysphoric mood. The patient is not nervous/anxious.        Visit Vitals  /70   Pulse (!) 47   Temp 97.5 °F (36.4 °C)   Ht 176.5 cm (69.49\")   Wt 71.2 kg (157 lb)   SpO2 98%   BMI 22.86 kg/m²       Objective  Physical Exam   Constitutional: She is oriented to person, place, and time. She appears well-developed and well-nourished. No distress.   HENT:   Nose: Nose normal.   Mouth/Throat: Oropharynx is clear and moist.   Eyes: " Conjunctivae and EOM are normal. No scleral icterus.   Neck: No tracheal deviation present. No thyromegaly present.   Cardiovascular: Normal rate and regular rhythm.  Exam reveals no friction rub.    No murmur heard.  Pulmonary/Chest: No respiratory distress. She has no wheezes. She has no rales.   Abdominal: Soft. She exhibits no distension and no mass. There is no tenderness. There is no guarding.   Musculoskeletal: Normal range of motion. She exhibits no deformity.   Lymphadenopathy:     She has no cervical adenopathy.   Neurological: She is alert and oriented to person, place, and time. She has normal reflexes. No cranial nerve deficit. Coordination normal.   Skin: Skin is warm and dry. No rash noted. No erythema.        Sub cut lesion 1x2 cm. Smooth surface, non tender   Psychiatric: She has a normal mood and affect. Her behavior is normal. Judgment and thought content normal.       Diagnoses and all orders for this visit:    Lesion of neck suspect lipoma. Referral to surgery    Bradycardia noted incidentally EKG unremarkable. Hemodynamically stable denies fatigue or lightheadedness dizziness will continue to monitor for now. She has not on any rate limiting medications      ECG 12 Lead  Date/Time: 11/2/2018 2:18 PM  Performed by: MARCOS PIZANO  Authorized by: MARCOS PIZANO   Previous ECG: no previous ECG available  Rhythm: sinus bradycardia  Rate: bradycardic  ST Segments: ST segments normal  T Waves: T waves normal  QRS axis: normal  Clinical impression: non-specific ECG

## 2018-12-19 ENCOUNTER — OFFICE VISIT (OUTPATIENT)
Dept: SURGERY | Facility: CLINIC | Age: 63
End: 2018-12-19

## 2018-12-19 VITALS
SYSTOLIC BLOOD PRESSURE: 128 MMHG | HEIGHT: 69 IN | TEMPERATURE: 98.1 F | WEIGHT: 155 LBS | BODY MASS INDEX: 22.96 KG/M2 | OXYGEN SATURATION: 99 % | DIASTOLIC BLOOD PRESSURE: 62 MMHG | HEART RATE: 55 BPM

## 2018-12-19 DIAGNOSIS — D17.0 LIPOMA OF NECK: Primary | ICD-10-CM

## 2018-12-19 DIAGNOSIS — D17.24 LIPOMA OF LEFT THIGH: ICD-10-CM

## 2018-12-19 PROCEDURE — 99203 OFFICE O/P NEW LOW 30 MIN: CPT | Performed by: SURGERY

## 2019-01-09 ENCOUNTER — PROCEDURE VISIT (OUTPATIENT)
Dept: SURGERY | Facility: CLINIC | Age: 64
End: 2019-01-09

## 2019-01-09 VITALS
BODY MASS INDEX: 22.96 KG/M2 | HEART RATE: 51 BPM | DIASTOLIC BLOOD PRESSURE: 80 MMHG | TEMPERATURE: 98.1 F | OXYGEN SATURATION: 98 % | HEIGHT: 69 IN | SYSTOLIC BLOOD PRESSURE: 142 MMHG | WEIGHT: 154.98 LBS

## 2019-01-09 DIAGNOSIS — L72.3 SEBACEOUS CYST: Primary | ICD-10-CM

## 2019-01-09 DIAGNOSIS — D17.24 LIPOMA OF LEFT THIGH: Primary | ICD-10-CM

## 2019-01-09 DIAGNOSIS — R22.1 PALPABLE MASS OF NECK: ICD-10-CM

## 2019-01-09 PROCEDURE — 27327 EXC THIGH/KNEE LES SC < 3 CM: CPT | Performed by: SURGERY

## 2019-01-09 PROCEDURE — 21555 EXC NECK LES SC < 3 CM: CPT | Performed by: SURGERY

## 2019-01-09 NOTE — PROGRESS NOTES
Location: posterior neck and left anterior thigh    Procedure:  Excision of palpable masses of the left anterior thigh and posterior neck      I recommended excision of the palpable masses in question.. Procedure and the risks and benefits were explained including bleeding and infection. The patient understands these and wishes to proceed.  Informed consent was signed.    The patient was brought to the procedure room. A time out was called. The area was prepped and draped in the usual fashion. 1% lidocaine with epinephrine was infused locally. A transverse incision was made overlying the palpable mass of the left thigh.  The mass was identified and dissected away from the surrounding soft tissue.  It appeared to consistent with a benign lipoma, and was 3 cm in greatest dimension.  Once the mass was completely dissected free, it was delivered out of the wound and transected at the base.  The mass was sent for pathology evaluation.  The wound was checked for hemostasis and this was found to be excellent.  Wound was then closed in layers using a 3-0 Monocryl and a 4-0 Monocryl.  Benzoin and Steri-Strips were applied to the skin and a dry sterile dressing was placed.      Tension was then turned to the posterior neck mass.  This was similarly prepped and draped in the usual sterile fashion.  The skin overlying the area was infiltrated with 1% lidocaine with epinephrine.  Once again a transverse incision was made and was deepened through the skin into the subcutaneous tissue.  An irregular mass was identified and was noted to be dark in color.  Upon further dissecting this away from the surrounding tissue, it appeared consistent with a capillary hemangioma with clot.  This was carefully dissected away from the adjacent tissue and was noted to be multilobulated.  Ultimately, this was able to be dissected free and delivered out of the wound.  It was then transected and passed off the field as specimen.  The area was  checked for hemostasis, and once this was ensured, the wound was closed in layers, again with a 3-0 Monocryl and 4-0 Monocryl.  Benzoin and Steri-Strips were again applied and a dry dressing was placed.       There were no complications and the patient tolerated the procedure well. Wound instructions were given.

## 2019-01-23 ENCOUNTER — OFFICE VISIT (OUTPATIENT)
Dept: SURGERY | Facility: CLINIC | Age: 64
End: 2019-01-23

## 2019-01-23 VITALS
HEART RATE: 51 BPM | SYSTOLIC BLOOD PRESSURE: 112 MMHG | DIASTOLIC BLOOD PRESSURE: 60 MMHG | OXYGEN SATURATION: 99 % | HEIGHT: 69 IN | WEIGHT: 154.98 LBS | BODY MASS INDEX: 22.96 KG/M2 | TEMPERATURE: 97.9 F

## 2019-01-23 DIAGNOSIS — D17.24 LIPOMA OF LEFT THIGH: Primary | ICD-10-CM

## 2019-01-23 DIAGNOSIS — D18.00 CAVERNOUS HEMANGIOMA: ICD-10-CM

## 2019-01-23 PROCEDURE — 99024 POSTOP FOLLOW-UP VISIT: CPT | Performed by: SURGERY

## 2019-02-06 ENCOUNTER — OFFICE VISIT (OUTPATIENT)
Dept: SURGERY | Facility: CLINIC | Age: 64
End: 2019-02-06

## 2019-02-06 VITALS
TEMPERATURE: 97.8 F | DIASTOLIC BLOOD PRESSURE: 64 MMHG | HEART RATE: 78 BPM | BODY MASS INDEX: 23.99 KG/M2 | OXYGEN SATURATION: 99 % | WEIGHT: 162 LBS | HEIGHT: 69 IN | SYSTOLIC BLOOD PRESSURE: 110 MMHG

## 2019-02-06 DIAGNOSIS — D17.24 LIPOMA OF LEFT LOWER EXTREMITY: Primary | ICD-10-CM

## 2019-02-06 PROCEDURE — 99024 POSTOP FOLLOW-UP VISIT: CPT | Performed by: SURGERY

## 2019-02-06 NOTE — PROGRESS NOTES
"Subjective   Andrea Woody is a 63 y.o. female.   Chief Complaint   Patient presents with   • Post-op     po exc on the leg       History of Present Illness   Ms. Woody returns for evaluation of drainage from her left anterior thigh incision.  She describes this as yellow.  She reports a skin separation. She denies fevers or chills.  She denies redness.      The following portions of the patient's history were reviewed and updated as appropriate: allergies, current medications, past family history, past medical history, past social history, past surgical history and problem list.    Review of Systems   Constitutional: Negative for chills, fatigue and fever.   Respiratory: Negative for cough.    Cardiovascular: Negative for chest pain.   Gastrointestinal: Negative for abdominal pain, diarrhea, nausea and vomiting.   Skin: Positive for wound.       Objective    /64   Pulse 78   Temp 97.8 °F (36.6 °C) (Temporal)   Ht 175.3 cm (69.02\")   Wt 73.5 kg (162 lb)   SpO2 99%   BMI 23.91 kg/m²     Physical Exam   Constitutional: She is oriented to person, place, and time. She appears well-developed and well-nourished.   HENT:   Head: Normocephalic and atraumatic.   Cardiovascular: Regular rhythm.   Pulmonary/Chest: Effort normal.   Neurological: She is alert and oriented to person, place, and time.   Skin: Skin is warm and dry.   Left anterior thigh incision with slight separation.  No cellulitis or erythema.  No fluctuance to suggest abscess.     Psychiatric: She has a normal mood and affect. Her behavior is normal.       Assessment/Plan   Andrea was seen today for post-op.    Diagnoses and all orders for this visit:    Lipoma of left lower extremity      Ms. Woody is s/p excision of lipoma of the LLE, here for a wound check.  She does have a superficial separation of the skin, but there are no signs of wound infection.  Discussed wound care with the patient.  Follow up as needed           "

## 2019-03-12 ENCOUNTER — OFFICE VISIT (OUTPATIENT)
Dept: INTERNAL MEDICINE | Facility: CLINIC | Age: 64
End: 2019-03-12

## 2019-03-12 VITALS
SYSTOLIC BLOOD PRESSURE: 149 MMHG | HEART RATE: 97 BPM | TEMPERATURE: 98 F | BODY MASS INDEX: 24.65 KG/M2 | OXYGEN SATURATION: 100 % | WEIGHT: 167 LBS | DIASTOLIC BLOOD PRESSURE: 61 MMHG

## 2019-03-12 DIAGNOSIS — M25.532 LEFT WRIST PAIN: Primary | ICD-10-CM

## 2019-03-12 PROCEDURE — 99213 OFFICE O/P EST LOW 20 MIN: CPT | Performed by: PHYSICIAN ASSISTANT

## 2019-03-12 NOTE — PATIENT INSTRUCTIONS
Make sure you're taking mobic (meloxicam)- your anti-inflammatory    Extra Tylenol (acetaminophen) - take around dinner time    If we're not improving, we will refer you to orthopedics.

## 2019-03-12 NOTE — PROGRESS NOTES
Subjective     Chief Complaint   Patient presents with   • Wrist Pain     left wrist pain but mainly at night       History of Present Illness     Andrea Woody is a 63 y.o. female presenting with complaints of left wrist pain on radial side, worsening over the past month or so. Typically hurts at night time. No injury that she is aware of. She denies any numbness, tingling, or burning. She is right-handed.     The following portions of the patient's history were reviewed and updated as appropriate: current medications, allergies, PMH.    Review of Systems   Constitutional: Negative for appetite change, chills, fatigue, fever and unexpected weight change.   HENT: Negative for congestion, ear pain, hearing loss, nosebleeds, sinus pressure, sore throat, tinnitus and trouble swallowing.    Eyes: Negative for pain, discharge, redness, itching and visual disturbance.   Respiratory: Negative for cough, chest tightness, shortness of breath and wheezing.    Cardiovascular: Negative for chest pain, palpitations and leg swelling.   Gastrointestinal: Negative for abdominal pain, blood in stool, constipation, diarrhea, nausea and vomiting.   Endocrine: Negative for cold intolerance, heat intolerance, polydipsia, polyphagia and polyuria.   Genitourinary: Negative for decreased urine volume, dysuria, flank pain, frequency and hematuria.   Musculoskeletal: Positive for arthralgias. Negative for back pain, gait problem, joint swelling, myalgias, neck pain and neck stiffness.   Skin: Negative for color change and rash.   Allergic/Immunologic: Negative for environmental allergies, food allergies and immunocompromised state.   Neurological: Negative for dizziness, syncope, weakness, light-headedness and headaches.   Hematological: Negative for adenopathy. Does not bruise/bleed easily.   Psychiatric/Behavioral: Negative for dysphoric mood, sleep disturbance and suicidal ideas. The patient is not nervous/anxious.      Objective  "    Vitals:    03/12/19 1050   BP: 149/61   Pulse: 97   Temp: 98 °F (36.7 °C)   SpO2: 100%   Weight: 75.8 kg (167 lb)       Physical Exam   Constitutional: She is oriented to person, place, and time. She appears well-developed and well-nourished. No distress.   Cardiovascular: Normal rate and regular rhythm.   Pulmonary/Chest: Effort normal and breath sounds normal.   Musculoskeletal:        Left wrist: She exhibits decreased range of motion and tenderness.   Tender to palpation radial side of left wrist. \"Knot\" along radius.   Neurological: She is alert and oriented to person, place, and time.   Skin: Skin is warm and dry.   Psychiatric: She has a normal mood and affect.       Assessment/Plan     Diagnoses and all orders for this visit:    Left wrist pain  -     Ambulatory Referral to Orthopedic Surgery    Patient takes mobic daily. Add on tylenol arthritis.  Discussed rest, bracing.   Concern for bone spur or ganglion cyst.    Tricia Hanks PA-C  03/12/2019         Please note that portions of this note were completed with a voice recognition program. Efforts were made to edit dictation, but occasionally words are mistranscribed.    "

## 2019-03-29 ENCOUNTER — OFFICE VISIT (OUTPATIENT)
Dept: INTERNAL MEDICINE | Facility: CLINIC | Age: 64
End: 2019-03-29

## 2019-03-29 VITALS
HEART RATE: 55 BPM | DIASTOLIC BLOOD PRESSURE: 70 MMHG | OXYGEN SATURATION: 94 % | WEIGHT: 166 LBS | BODY MASS INDEX: 24.59 KG/M2 | TEMPERATURE: 97.9 F | HEIGHT: 69 IN | SYSTOLIC BLOOD PRESSURE: 128 MMHG

## 2019-03-29 DIAGNOSIS — M54.50 CHRONIC MIDLINE LOW BACK PAIN WITHOUT SCIATICA: Primary | ICD-10-CM

## 2019-03-29 DIAGNOSIS — G89.29 CHRONIC MIDLINE LOW BACK PAIN WITHOUT SCIATICA: Primary | ICD-10-CM

## 2019-03-29 PROCEDURE — 99213 OFFICE O/P EST LOW 20 MIN: CPT | Performed by: FAMILY MEDICINE

## 2019-03-29 RX ORDER — DICLOFENAC SODIUM 75 MG/1
75 TABLET, DELAYED RELEASE ORAL 2 TIMES DAILY
Qty: 60 TABLET | Refills: 0 | Status: SHIPPED | OUTPATIENT
Start: 2019-03-29 | End: 2019-04-18

## 2019-03-29 RX ORDER — ASPIRIN 81 MG/1
81 TABLET ORAL DAILY
COMMUNITY
End: 2019-10-15 | Stop reason: HOSPADM

## 2019-03-29 NOTE — PROGRESS NOTES
Andrea Woody is a 63 y.o. female.    Chief Complaint   Patient presents with   • Back Pain     x1 month        HPI   Patient has been complaining of back - lower pain for a long time, but worsened over the last month. Pain is a 4 on a scale of 0-10. Pain is sharp and stabbing. Pain radiates to nowhere. Pain is worse with staying still, better with movement. Symptoms are worse.  Patient has tried mobic and tylenol with little relief.    The following portions of the patient's history were reviewed and updated as appropriate: allergies, current medications, past family history, past medical history, past social history, past surgical history and problem list.     No Known Allergies      Current Outpatient Medications:   •  aspirin 81 MG EC tablet, Take 81 mg by mouth Daily., Disp: , Rfl:   •  hydrochlorothiazide (MICROZIDE) 12.5 MG capsule, Take 1 capsule by mouth Daily., Disp: 90 capsule, Rfl: 3  •  meloxicam (MOBIC) 15 MG tablet, TAKE ONE TABLET BY MOUTH ONCE DAILY, Disp: 90 tablet, Rfl: 3  •  diclofenac (VOLTAREN) 75 MG EC tablet, Take 1 tablet by mouth 2 (Two) Times a Day. Do not take with meloxicam, Disp: 60 tablet, Rfl: 0  •  traZODone (DESYREL) 150 MG tablet, Take 1 tablet by mouth every night., Disp: 30 tablet, Rfl: 11    ROS    Review of Systems   Constitutional: Negative for chills, fatigue and fever.   HENT: Negative for congestion, postnasal drip and sore throat.    Respiratory: Negative for cough, shortness of breath and wheezing.    Cardiovascular: Negative for chest pain and leg swelling.   Gastrointestinal: Negative for abdominal pain, constipation, diarrhea, nausea and vomiting.   Musculoskeletal: Positive for arthralgias and back pain.   Neurological: Negative for weakness and headache.   Psychiatric/Behavioral: Positive for sleep disturbance.       Vitals:    03/29/19 1128   BP: 128/70   BP Location: Left arm   Patient Position: Sitting   Cuff Size: Adult   Pulse: 55   Temp: 97.9 °F (36.6 °C)  "  TempSrc: Oral   SpO2: 94%   Weight: 75.3 kg (166 lb)   Height: 175.3 cm (69.02\")     Body mass index is 24.5 kg/m².    Physical Exam     Physical Exam   Constitutional: She is oriented to person, place, and time. She appears well-developed and well-nourished. No distress.   HENT:   Head: Normocephalic and atraumatic.   Right Ear: External ear normal.   Left Ear: External ear normal.   Eyes: Conjunctivae and EOM are normal.   Neck: Normal range of motion. Neck supple.   Cardiovascular: Normal rate and regular rhythm.   No murmur heard.  Pulmonary/Chest: Effort normal and breath sounds normal. No respiratory distress. She has no wheezes.   Abdominal: Soft. Bowel sounds are normal. She exhibits no distension. There is no tenderness.   Musculoskeletal: She exhibits no edema or deformity.   Lymphadenopathy:     She has no cervical adenopathy.   Neurological: She is alert and oriented to person, place, and time. No cranial nerve deficit.   Skin: Skin is warm and dry.   Psychiatric: She has a normal mood and affect. Her behavior is normal.       Assessment/Plan    Problem List Items Addressed This Visit        Nervous and Auditory    Chronic midline low back pain without sciatica - Primary     Will try patient on diclofenac.  Advised not to take with mobic.  May take tylenol if needed.  Continue exercises and stretches.               New Medications Ordered This Visit   Medications   • diclofenac (VOLTAREN) 75 MG EC tablet     Sig: Take 1 tablet by mouth 2 (Two) Times a Day. Do not take with meloxicam     Dispense:  60 tablet     Refill:  0       No orders of the defined types were placed in this encounter.      Return if symptoms worsen or fail to improve.    Sherri Santos,   "

## 2019-03-29 NOTE — ASSESSMENT & PLAN NOTE
Will try patient on diclofenac.  Advised not to take with mobic.  May take tylenol if needed.  Continue exercises and stretches.

## 2019-03-29 NOTE — PATIENT INSTRUCTIONS
Try melatonin for sleep 2 hours before bedtime.  Do not take meloxicam with diclofenac.  May take tylenol.

## 2019-04-18 ENCOUNTER — OFFICE VISIT (OUTPATIENT)
Dept: INTERNAL MEDICINE | Facility: CLINIC | Age: 64
End: 2019-04-18

## 2019-04-18 VITALS
WEIGHT: 162 LBS | HEART RATE: 53 BPM | HEIGHT: 69 IN | OXYGEN SATURATION: 99 % | DIASTOLIC BLOOD PRESSURE: 72 MMHG | SYSTOLIC BLOOD PRESSURE: 132 MMHG | TEMPERATURE: 97.6 F | BODY MASS INDEX: 23.99 KG/M2

## 2019-04-18 DIAGNOSIS — M54.50 ACUTE MIDLINE LOW BACK PAIN WITHOUT SCIATICA: Primary | ICD-10-CM

## 2019-04-18 PROCEDURE — 99213 OFFICE O/P EST LOW 20 MIN: CPT | Performed by: INTERNAL MEDICINE

## 2019-04-18 NOTE — PROGRESS NOTES
"Subjective  Andrea Woody is a 63 y.o. female    HPI coming in with complaints of low back pain without radiation to her legs no new trauma dull ache ongoing for about a month.  Very active runs for exercise.  Has hypertension.  Has tried NSAIDs with some relief    The following portions of the patient's history were reviewed and updated as appropriate: allergies, current medications, past family history, past medical history, past social history, past surgical history, and problem list.     Review of Systems   Constitutional: Negative.  Negative for activity change, appetite change, fatigue and fever.   HENT: Negative for congestion, ear discharge, ear pain and trouble swallowing.    Eyes: Negative for photophobia and visual disturbance.   Respiratory: Negative for cough and shortness of breath.    Cardiovascular: Negative for chest pain and palpitations.   Gastrointestinal: Negative for abdominal distention, abdominal pain, constipation, diarrhea, nausea and vomiting.   Endocrine: Negative.    Genitourinary: Negative for dysuria, hematuria and urgency.   Musculoskeletal: Positive for arthralgias and back pain. Negative for joint swelling and myalgias.   Skin: Negative for color change and rash.   Allergic/Immunologic: Negative.    Neurological: Negative for dizziness, weakness, light-headedness and headaches.   Hematological: Negative for adenopathy. Does not bruise/bleed easily.   Psychiatric/Behavioral: Negative for agitation, confusion and dysphoric mood. The patient is not nervous/anxious.        Visit Vitals  /72 Comment: Automatic   Pulse 53   Temp 97.6 °F (36.4 °C) (Temporal)   Ht 175.3 cm (69.02\")   Wt 73.5 kg (162 lb)   SpO2 99%   BMI 23.91 kg/m²       Objective  Physical Exam   Constitutional: She is oriented to person, place, and time. She appears well-developed and well-nourished. No distress.   HENT:   Nose: Nose normal.   Mouth/Throat: Oropharynx is clear and moist.   Eyes: Conjunctivae and EOM " are normal. No scleral icterus.   Neck: No tracheal deviation present. No thyromegaly present.   Cardiovascular: Normal rate and regular rhythm. Exam reveals no friction rub.   No murmur heard.  Pulmonary/Chest: No respiratory distress. She has no wheezes. She has no rales.   Abdominal: Soft. She exhibits no distension and no mass. There is no tenderness. There is no guarding.   Musculoskeletal: Normal range of motion. She exhibits no deformity.   Lymphadenopathy:     She has no cervical adenopathy.   Neurological: She is alert and oriented to person, place, and time. She has normal reflexes. No cranial nerve deficit. Coordination normal.   Skin: Skin is warm and dry. No rash noted. No erythema.   Psychiatric: She has a normal mood and affect. Her behavior is normal. Judgment and thought content normal.       Diagnoses and all orders for this visit:    Acute midline low back pain without sciatica suspect DJD discussed stretching exercises NSAIDs as needed imaging studies if not better in a few weeks does not have evidence of radiculopathy

## 2019-04-22 ENCOUNTER — TELEPHONE (OUTPATIENT)
Dept: INTERNAL MEDICINE | Facility: CLINIC | Age: 64
End: 2019-04-22

## 2019-04-22 DIAGNOSIS — G89.29 CHRONIC MIDLINE LOW BACK PAIN WITHOUT SCIATICA: Primary | ICD-10-CM

## 2019-04-22 DIAGNOSIS — M54.50 CHRONIC MIDLINE LOW BACK PAIN WITHOUT SCIATICA: Primary | ICD-10-CM

## 2019-04-22 NOTE — TELEPHONE ENCOUNTER
Patient called and states she was seen last week for low back pain. She states its not getting any better. She states she has been doing the stretches that dr drake gave her. She states it shoots across her lower back. Patient denies any pain going down her legs. She states she is does get relief after laying down on her side for awhile. She would like to see what else she can do.

## 2019-04-24 ENCOUNTER — HOSPITAL ENCOUNTER (OUTPATIENT)
Dept: MRI IMAGING | Facility: HOSPITAL | Age: 64
Discharge: HOME | End: 2019-04-24
Admitting: INTERNAL MEDICINE

## 2019-04-24 DIAGNOSIS — M54.50 CHRONIC MIDLINE LOW BACK PAIN WITHOUT SCIATICA: ICD-10-CM

## 2019-04-24 DIAGNOSIS — G89.29 CHRONIC MIDLINE LOW BACK PAIN WITHOUT SCIATICA: ICD-10-CM

## 2019-04-24 PROCEDURE — 72148 MRI LUMBAR SPINE W/O DYE: CPT

## 2019-04-25 DIAGNOSIS — M48.07 SPINAL STENOSIS OF LUMBOSACRAL REGION: Primary | ICD-10-CM

## 2019-05-14 ENCOUNTER — OFFICE VISIT (OUTPATIENT)
Dept: NEUROSURGERY | Facility: CLINIC | Age: 64
End: 2019-05-14

## 2019-05-14 DIAGNOSIS — M53.2X6 SPINAL INSTABILITY OF LUMBAR REGION: ICD-10-CM

## 2019-05-14 DIAGNOSIS — M51.36 BULGING LUMBAR DISC: Primary | ICD-10-CM

## 2019-05-14 DIAGNOSIS — M51.36 DDD (DEGENERATIVE DISC DISEASE), LUMBAR: ICD-10-CM

## 2019-05-14 PROBLEM — M48.062 SPINAL STENOSIS, LUMBAR REGION, WITH NEUROGENIC CLAUDICATION: Status: ACTIVE | Noted: 2019-05-14

## 2019-05-14 PROCEDURE — 99243 OFF/OP CNSLTJ NEW/EST LOW 30: CPT | Performed by: NEUROLOGICAL SURGERY

## 2019-05-14 NOTE — PROGRESS NOTES
Subjective   Patient ID: Andrea Woody is a 63 y.o. female is being seen for consultation today at the request of Lonnie Carpio MD  Chief Complaint: Back pain    History of Present Illness: The patient is a 63-year-old retired -American female from [place] with a history of back pain that began 2 months ago when she lifted heavy pots of water to move to the trash.  Pain is in her mid lower back and does not radiate to her legs.  She is stiff and sore when she tries to get up in the morning.  She is bit better if she moves around.  She has trouble finding comfort at night.  This happened 2 months ago and has not gotten any better.  Prolonged standing and walking actually make it worse.  She typically runs for exercise daily but has had to stop, although she is continued with her routine stretching exercises.  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    Review of Radiographic Studies:  Lumbar MRI scan shows severe stenosis at L2-3 with a central disc bulge, mild to moderate stenosis at L3-4 and L4-5, with a very slight degree of spondylolisthesis at L4-5.  Radiographically it appears that she is symptomatic at L2-3.    The following portions of the patient's history were reviewed, updated as appropriate and approved: allergies, current medications, past family history, past medical history, past social history, past surgical history, review of systems and problem list.  Review of Systems   Constitutional: Negative for activity change, appetite change, chills, diaphoresis, fatigue, fever and unexpected weight change.   HENT: Negative for congestion, dental problem, drooling, ear discharge, ear pain, facial swelling, hearing loss, mouth sores, nosebleeds, postnasal drip, rhinorrhea, sinus pressure, sneezing, sore throat, tinnitus, trouble swallowing and voice change.    Eyes: Negative for photophobia, pain,  discharge, redness, itching and visual disturbance.   Respiratory: Negative for apnea, cough, choking, chest tightness, shortness of breath, wheezing and stridor.    Cardiovascular: Negative for chest pain, palpitations and leg swelling.   Gastrointestinal: Negative for abdominal distention, abdominal pain, anal bleeding, blood in stool, constipation, diarrhea, nausea, rectal pain and vomiting.   Endocrine: Negative for cold intolerance, heat intolerance, polydipsia, polyphagia and polyuria.   Genitourinary: Negative for decreased urine volume, difficulty urinating, dysuria, enuresis, flank pain, frequency, genital sores, hematuria and urgency.   Musculoskeletal: Positive for arthralgias, back pain, gait problem and myalgias. Negative for joint swelling, neck pain and neck stiffness.   Skin: Negative for color change, pallor, rash and wound.   Allergic/Immunologic: Negative for environmental allergies, food allergies and immunocompromised state.   Neurological: Positive for tremors and weakness. Negative for dizziness, seizures, syncope, facial asymmetry, speech difficulty, light-headedness, numbness and headaches.   Hematological: Negative for adenopathy. Does not bruise/bleed easily.   Psychiatric/Behavioral: Negative for agitation, behavioral problems, confusion, decreased concentration, dysphoric mood, hallucinations, self-injury, sleep disturbance and suicidal ideas. The patient is not nervous/anxious and is not hyperactive.        Objective     NEUROLOGICAL EXAMINATION:      MENTAL STATUS:  Alert and oriented.  Speech intact.  Recent and remote memory intact.      CRANIAL NERVES:  Cranial nerve II:  Visual fields are full.  Cranial nerves III, IV and VI:  PERRLADC.  Extraocular movements are intact.  Nystagmus is not present.  Cranial nerve V:  Facial sensation is intact.  Cranial nerve VII:  Muscles of facial expression reveal no asymmetry.  Cranial nerve VIII:  Hearing is intact.  Cranial nerves IX and X:   Palate elevates symmetrically.  Cranial nerve XI:  Shoulder shrug is intact.  Cranial nerve XII:  Tongue is midline without evidence of atrophy or fasciculation.    MUSCULOSKELETAL: SLR negative bilaterally.    MOTOR: No weakness of knee extension, ankle dorsiflexion, plantarflexion.    SENSATION: Intact sensation to the lower extremities.    REFLEXES:  DTR 1+ both knees and both ankles.    Assessment   Symptomatic stenosis L2-3.       Plan   Lumbar epidural steroid injection.  Follow-up 1 month.  Continue self-directed exercise, which is as good his formal physical therapy based on her description of her athletic routine and capability.  If this does not improve in the next month, I will recommend a minimal access lumbar laminectomy at L2-3.       Ronald Neri MD

## 2019-05-20 ENCOUNTER — TELEPHONE (OUTPATIENT)
Dept: PAIN MEDICINE | Facility: CLINIC | Age: 64
End: 2019-05-20

## 2019-05-20 PROBLEM — M51.26 DISPLACEMENT OF LUMBAR INTERVERTEBRAL DISC: Status: ACTIVE | Noted: 2019-05-20

## 2019-05-20 PROBLEM — F41.1 GAD (GENERALIZED ANXIETY DISORDER): Status: ACTIVE | Noted: 2019-05-20

## 2019-05-20 NOTE — PROGRESS NOTES
"Chief Complaint: \"Pain in my lower back.\"         History of Present Illness:   Patient: Ms. Andrea Woody, 63 y.o. female   Referring physician: Dr. Ronald Neri   Reason for referral: Consultation for intractable chronic lower back pain   Pain history: Patient reports a 3-month history of unrelenting pain, which began after lifting a bucket filled with water. Pain has progressed in intensity over the past 3 months. She has trouble finding comfort at night.   Pain description: constant pain with intermittent exacerbation, described as aching and pulling sensation.   Radiation of pain: The pain does not radiate.  Pain intensity today: 7/10  Average pain intensity last week: 8/10  Pain intensity ranges from: 2/10 to 10/10  Aggravating factors: Pain increases with twisting, standing, and walking about 1 walk  Alleviating factors: Pain decreases with lying or sitting down    Associated symptoms:   Patient denies pain, numbness or weakness in the lower extremities.   Patient denies any new bladder or bowel problems.   Patient denies difficulties with her balance or recent falls     Review of previous therapies and additional medical records:  Andrea Woody has already failed the following measures, including:   Conservative measures: oral analgesics, topical analgesics, ice, heat and epsom salt soaks, physical therapy   Interventional measures: None  Surgical measures: No history of lumbar spine surgery  Andrea Woody underwent neurosurgical consultation with Dr. Neri on 05/14/2019, and was found to be a potential surgical candidate.  Andrea Woody presents with significant comorbidities including insomnia, eczema engaged in treatment.  In terms of current analgesics, Andrea Woody takes: ibuprofen  I have reviewed [facility] Report #[id number] consistent to medication reconciliation.     Global Pain Scale 05-21 2019                  Pain  14                 Feelings  2                 Clinical outcomes  1                 Activities  17 "                 GPS Total:  34                    Review of Diagnostic Studies:    MRI Lumbar Spine, 04/24/2019: I have reviewed the images. Lumbar vertebral bodies maintain a normal height, alignment and signal intensity. The posterior elements are intact. The spinal cord terminates at the T12/L1 level. No conus lesions are present. The paraspinal soft tissues are unremarkable.  L2-L3: broad-based disc bulge which is eccentric to the left which combined with facet arthropathy produces severe central stenosis, moderate to severe left neural foraminal narrowing and mild right neural foraminal narrowing.   L3-L4: broad-based disc bulge with facet arthropathy and ligamentum flavum thickening which produces mild central stenosis and mild left neural foraminal narrowing.   L4-L5: posterior disc osteophyte complex with facet arthropathy and ligamentum flavum thickening which produces moderate to severe central stenosis. There is no significant neural foraminal narrowing.  L5-S1: facet arthropathy more pronounced on the left than on the right, however there is no significant spinal or neural foraminal stenosis.     Review of Systems   Constitutional: Positive for activity change and fever.   Musculoskeletal: Positive for back pain and myalgias.   Psychiatric/Behavioral: Positive for sleep disturbance. The patient is nervous/anxious.    All other systems reviewed and are negative.        Patient Active Problem List   Diagnosis   • Primary insomnia   • Chronic midline low back pain without sciatica   • Encounter for screening colonoscopy   • Anxiety   • Lumbar stenosis with neurogenic claudication   • STEPHENIE (generalized anxiety disorder)   • Displacement of lumbar intervertebral disc   • Osteoarthritis of right hip       Past Medical History:   Diagnosis Date   • Eczema    • Hemorrhoid    • Insomnia          Past Surgical History:   Procedure Laterality Date   • HYSTERECTOMY           Family History   Family history unknown:  "Yes         Social History     Socioeconomic History   • Marital status:      Spouse name: Not on file   • Number of children: Not on file   • Years of education: Not on file   • Highest education level: Not on file   Occupational History   • Occupation: retired    Tobacco Use   • Smoking status: Never Smoker   • Smokeless tobacco: Never Used   Substance and Sexual Activity   • Alcohol use: Yes     Comment: occassional   • Drug use: No   • Sexual activity: Defer           Current Outpatient Medications:   •  aspirin 81 MG EC tablet, Take 81 mg by mouth Daily., Disp: , Rfl:   •  hydrochlorothiazide (MICROZIDE) 12.5 MG capsule, Take 1 capsule by mouth Daily., Disp: 90 capsule, Rfl: 3  •  ibuprofen (ADVIL,MOTRIN) 600 MG tablet, Take 600 mg by mouth Every 6 (Six) Hours As Needed for Mild Pain ., Disp: , Rfl:       No Known Allergies      /68 (BP Location: Left arm, Patient Position: Sitting)   Pulse 77   Temp 98 °F (36.7 °C) (Temporal)   Resp 17   Ht 175.3 cm (69.02\")   Wt 75.8 kg (167 lb)   SpO2 98%   BMI 24.65 kg/m²       Physical Exam:  Constitutional: Patient is oriented to person, place, and time. Patient appears well-developed and well-nourished.   HEENT: Head: Normocephalic and atraumatic. Eyes: Conjunctivae and lids are normal. Pupils: Equal, round, reactive to light.   Neck: Trachea normal. Neck supple. No JVD present.   Pulmonary Respiratory effort: No increased work of breathing or signs of respiratory distress. Auscultation of lungs: Clear to auscultation.   Cardiovascular Auscultation of heart: Normal rate and rhythm, normal S1 and S2, no murmurs.   Musculoskeletal   Gait and station: Gait evaluation demonstrated a normal gait   Lumbar spine: Passive and active range of motion are almost full and without pain. Extension, flexion, lateral flexion, rotation of the lumbar spine did not increase or reproduce pain. Lumbar facet joint loading maneuvers are negative.   Tejas test, " Gaenslen's test, thigh thrust test, SI compression test, Yeoman's test are negative   Piriformis maneuvers are negative   Palpation of the bilateral ischial tuberosities, unrevealing   Palpation of the bilateral greater trochanter, unrevealing   Examination of the Iliotibial band: unrevealing   Hip joints: The range of motion of the left hip joint is full and without pain, limited to flexion and internal rotation on the right   Neurological: Patient is alert and oriented to person, place, and time. Speech: speech is normal. Cortical function: Normal mental status.   Cranial nerves: Cranial nerves 2-12 intact.   Reflex Scores:  Right patellar: 1+  Left patellar: 1+  Right Achilles: 1+  Left Achilles: 1+  Motor strength: 5/5  Motor Tone: normal tone.   Involuntary movements: none.   Superficial/Primitive Reflexes: primitive reflexes were absent.   Right Tanner: absent  Left Tanner: absent  Right ankle clonus: absent  Left ankle clonus: absent   Negative long tract signs. Straight leg raising test is negative. Femoral stretch sign is negative.   Sensation: No sensory loss. Sensory exam: intact to light touch, intact pain and temperature sensation, intact vibration sensation and normal proprioception.   Coordination: Normal finger to nose and heel to shin. Normal balance and negative Romberg's sign   Skin and subcutaneous tissue: Skin is warm and intact. No rash noted. No cyanosis.   Psychiatric: Judgment and insight: Normal. Orientation to person, place and time: Normal. Recent and remote memory: Intact. Mood and affect: Normal.     ASSESSMENT:   1. Lumbar stenosis with neurogenic claudication    2. Displacement of lumbar intervertebral disc    3. Osteoarthritis of right hip, unspecified osteoarthritis type    4. Primary insomnia    5. STEPHENIE (generalized anxiety disorder)      PLAN/MEDICAL DECISION MAKING: I had a lengthy conversation with Ms. Andrea Woody regarding her chronic pain condition and potential therapeutic  options including risks, benefits, alternative therapies, to name a few. Patient presents with a 3-month history of unrelenting lower back pain. MRI of the lumbar spine revealed severe stenosis at L2-L3 with a central disc bulge. There is mild to moderate stenosis at L3-L4 and L4-L5, with a very slight degree of spondylolisthesis at L4-L5. Patient has failed to obtain pain relief with conservative measures,  I have reviewed all available patient's medical records as well as previous therapies as referenced above.  Therefore, I have proposed the following plan:  1. Diagnostic studies: X-rays of the left hip full views  2. Pharmacological measures: Reviewed. Discussed. Patient takes ibuprofen. Patient has declined additional pharmacological measures   3. Interventional pain management measures: Patient will be scheduled for diagnostic and therapeutic bilateral L2-L3 transforaminal epidural steroid injections. We may repeat epidurals depending on patient's outcome. Patient will follow-up with Dr. Neri thereafter. If patient fails to obtain relief, Dr. Neri has discussed the possibility of minimal access lumbar laminectomy at L2-L3.   4. Long-term rehabilitation efforts:  A. The patient does not have a history of falls. I did complete a risk assessment for falls.   B. Patient will start a comprehensive physical therapy program for water therapy, therapeutic exercise, core strengthening, gait and balance training, neurodynamics, myofascial release, cupping and dry needling once pain is under control  C. Start an exercise program such as yoga and water therapy  D. Contrast therapy: Apply ice-packs for 15-20 minutes, followed by heating pads for 15-20 minutes to affected area   5. The patient has been instructed to contact my office with any questions or difficulties. The patient understands the plan and agrees to proceed accordingly.       Patient Care Team:  Lonnie Carpio MD as PCP - General  Ginny Horton MD as  Consulting Physician (General Surgery)  Ronald Neri MD as Consulting Physician (Neurosurgery)  Bhanu Cota MD as Consulting Physician (Pain Medicine)     No orders of the defined types were placed in this encounter.        Future Appointments   Date Time Provider Department Center   6/18/2019  9:00 AM Ronald Neri MD [facility] None         Bhanu Cota MD     [facility] Dragon/Transcription disclaimer:  Much of this encounter note is an electronic transcription of spoken language to printed text. Electronic transcription of spoken language may permit erroneous, or at times, nonsensical words or phrases to be inadvertently transcribed. Although I have reviewed the note for such errors, some may still exist.

## 2019-05-21 ENCOUNTER — OFFICE VISIT (OUTPATIENT)
Dept: PAIN MEDICINE | Facility: CLINIC | Age: 64
End: 2019-05-21

## 2019-05-21 VITALS
RESPIRATION RATE: 17 BRPM | DIASTOLIC BLOOD PRESSURE: 68 MMHG | HEIGHT: 69 IN | HEART RATE: 77 BPM | SYSTOLIC BLOOD PRESSURE: 124 MMHG | BODY MASS INDEX: 24.73 KG/M2 | TEMPERATURE: 98 F | OXYGEN SATURATION: 98 % | WEIGHT: 167 LBS

## 2019-05-21 DIAGNOSIS — M51.26 DISPLACEMENT OF LUMBAR INTERVERTEBRAL DISC WITHOUT MYELOPATHY: ICD-10-CM

## 2019-05-21 DIAGNOSIS — M16.11 OSTEOARTHRITIS OF RIGHT HIP, UNSPECIFIED OSTEOARTHRITIS TYPE: ICD-10-CM

## 2019-05-21 DIAGNOSIS — M48.062 LUMBAR STENOSIS WITH NEUROGENIC CLAUDICATION: Primary | ICD-10-CM

## 2019-05-21 DIAGNOSIS — F41.1 GAD (GENERALIZED ANXIETY DISORDER): ICD-10-CM

## 2019-05-21 DIAGNOSIS — M48.062 LUMBAR STENOSIS WITH NEUROGENIC CLAUDICATION: ICD-10-CM

## 2019-05-21 DIAGNOSIS — F51.01 PRIMARY INSOMNIA: ICD-10-CM

## 2019-05-21 DIAGNOSIS — M51.26 DISPLACEMENT OF LUMBAR INTERVERTEBRAL DISC: ICD-10-CM

## 2019-05-21 PROCEDURE — 99204 OFFICE O/P NEW MOD 45 MIN: CPT | Performed by: ANESTHESIOLOGY

## 2019-05-21 RX ORDER — IBUPROFEN 600 MG/1
600 TABLET ORAL EVERY 6 HOURS PRN
COMMUNITY
End: 2019-08-16

## 2019-05-24 ENCOUNTER — HOSPITAL ENCOUNTER (OUTPATIENT)
Dept: GENERAL RADIOLOGY | Facility: HOSPITAL | Age: 64
Discharge: HOME | End: 2019-05-24
Admitting: ANESTHESIOLOGY

## 2019-05-24 PROCEDURE — 73521 X-RAY EXAM HIPS BI 2 VIEWS: CPT

## 2019-06-05 ENCOUNTER — OUTSIDE FACILITY SERVICE (OUTPATIENT)
Dept: PAIN MEDICINE | Facility: CLINIC | Age: 64
End: 2019-06-05

## 2019-06-05 PROCEDURE — 64483 NJX AA&/STRD TFRM EPI L/S 1: CPT | Performed by: ANESTHESIOLOGY

## 2019-06-05 PROCEDURE — 99152 MOD SED SAME PHYS/QHP 5/>YRS: CPT | Performed by: ANESTHESIOLOGY

## 2019-06-06 ENCOUNTER — TELEPHONE (OUTPATIENT)
Dept: PAIN MEDICINE | Facility: CLINIC | Age: 64
End: 2019-06-06

## 2019-06-06 NOTE — TELEPHONE ENCOUNTER
Patient had dx/tx bilateral L2-L3 TFESI on 06/05/19. Spoke with patient. She reports that she is doing great

## 2019-06-18 ENCOUNTER — OFFICE VISIT (OUTPATIENT)
Dept: NEUROSURGERY | Facility: CLINIC | Age: 64
End: 2019-06-18

## 2019-06-18 VITALS — WEIGHT: 167 LBS | BODY MASS INDEX: 24.73 KG/M2 | HEIGHT: 69 IN

## 2019-06-18 DIAGNOSIS — M51.36 BULGING LUMBAR DISC: Primary | ICD-10-CM

## 2019-06-18 PROCEDURE — 99213 OFFICE O/P EST LOW 20 MIN: CPT | Performed by: NEUROLOGICAL SURGERY

## 2019-06-18 NOTE — PROGRESS NOTES
Dm Woody is a 63 y.o. female who presents for follow up of lumbar stenosis L2-3.     The patient is a 63-year-old woman with a history of back pain that began approximately 3 months ago after lifting heavy pots of water to move trash.  Lumbar MRI scan showed a severe stenosis at L2-3 with a central bulge.  She was referred to Dr. Cota, who performed epidural steroid injection, which was dramatically successful in alleviating her pain within the first 24 hours.  She is now back to running routinely, up to 6 miles.  She is retired and lives in [place].    The following portions of the patient's history were reviewed, updated as appropriate and approved: allergies, current medications, past family history, past medical history, past social history, past surgical history, review of systems and problem list.     Review of Systems   Constitutional: Negative for activity change, appetite change, chills, diaphoresis, fatigue, fever and unexpected weight change.   HENT: Negative for congestion, dental problem, drooling, ear discharge, ear pain, facial swelling, hearing loss, mouth sores, nosebleeds, postnasal drip, rhinorrhea, sinus pressure, sneezing, sore throat, tinnitus, trouble swallowing and voice change.    Eyes: Negative for photophobia, pain, discharge, redness, itching and visual disturbance.   Respiratory: Negative for apnea, cough, choking, chest tightness, shortness of breath, wheezing and stridor.    Cardiovascular: Negative for chest pain, palpitations and leg swelling.   Gastrointestinal: Negative for abdominal distention, abdominal pain, anal bleeding, blood in stool, constipation, diarrhea, nausea, rectal pain and vomiting.   Endocrine: Negative for cold intolerance, heat intolerance, polydipsia, polyphagia and polyuria.   Genitourinary: Negative for decreased urine volume, difficulty urinating, dysuria, enuresis, flank pain, frequency, genital sores, hematuria and urgency.    Musculoskeletal: Positive for arthralgias, back pain, gait problem and myalgias. Negative for joint swelling, neck pain and neck stiffness.   Skin: Negative for color change, pallor, rash and wound.   Allergic/Immunologic: Negative for environmental allergies, food allergies and immunocompromised state.   Neurological: Positive for tremors and weakness. Negative for dizziness, seizures, syncope, facial asymmetry, speech difficulty, light-headedness, numbness and headaches.   Hematological: Negative for adenopathy. Does not bruise/bleed easily.   Psychiatric/Behavioral: Negative for agitation, behavioral problems, confusion, decreased concentration, dysphoric mood, hallucinations, self-injury, sleep disturbance and suicidal ideas. The patient is not nervous/anxious and is not hyperactive.        Objective    NEUROLOGICAL EXAMINATION:    Neurological exam is intact.    Assessment   Lumbar stenosis L2-3.  Excellent response to L2-3 lumbar ROSALIA by Dr. Cota.       Plan   No additional neurosurgical follow-up needed since the success with ROSALIA has been so good.  ROSALIA can be repeated if there is a recurrence of symptoms.  Follow-up with neurosurgery would only be necessary if the stenosis progresses and the pain management procedures failed to alleviate pain.       Ronald Neri MD

## 2019-07-08 ENCOUNTER — TELEPHONE (OUTPATIENT)
Dept: INTERNAL MEDICINE | Facility: CLINIC | Age: 64
End: 2019-07-08

## 2019-07-08 NOTE — TELEPHONE ENCOUNTER
Patient called and stated that her arthritis has gotten worse and was wondering if she could be put back on something.  She was on something before but can't remember the name.  Phone number verified.  Thank you.

## 2019-07-09 RX ORDER — MELOXICAM 15 MG/1
15 TABLET ORAL DAILY
Qty: 90 TABLET | Refills: 3 | Status: SHIPPED | OUTPATIENT
Start: 2019-07-09

## 2019-08-15 ENCOUNTER — TELEPHONE (OUTPATIENT)
Dept: INTERNAL MEDICINE | Facility: CLINIC | Age: 64
End: 2019-08-15

## 2019-08-15 NOTE — TELEPHONE ENCOUNTER
Back pain - had a back injection 6/4/19 at Dr. Forbes's office.     She has tried to schedule back with him but can not get back in with him until November.     Patient scheduled for tomorrow

## 2019-08-16 ENCOUNTER — OFFICE VISIT (OUTPATIENT)
Dept: INTERNAL MEDICINE | Facility: CLINIC | Age: 64
End: 2019-08-16

## 2019-08-16 VITALS
OXYGEN SATURATION: 96 % | DIASTOLIC BLOOD PRESSURE: 82 MMHG | HEART RATE: 52 BPM | BODY MASS INDEX: 25.15 KG/M2 | TEMPERATURE: 98.3 F | HEIGHT: 69 IN | SYSTOLIC BLOOD PRESSURE: 137 MMHG | WEIGHT: 169.8 LBS

## 2019-08-16 DIAGNOSIS — M54.50 CHRONIC MIDLINE LOW BACK PAIN WITHOUT SCIATICA: ICD-10-CM

## 2019-08-16 DIAGNOSIS — Z23 NEED FOR VACCINATION: Primary | ICD-10-CM

## 2019-08-16 DIAGNOSIS — G89.29 CHRONIC MIDLINE LOW BACK PAIN WITHOUT SCIATICA: ICD-10-CM

## 2019-08-16 PROBLEM — M51.26 DISPLACEMENT OF LUMBAR INTERVERTEBRAL DISC: Status: RESOLVED | Noted: 2019-05-20 | Resolved: 2019-08-16

## 2019-08-16 PROBLEM — M48.062 LUMBAR STENOSIS WITH NEUROGENIC CLAUDICATION: Status: RESOLVED | Noted: 2019-05-14 | Resolved: 2019-08-16

## 2019-08-16 PROCEDURE — 90715 TDAP VACCINE 7 YRS/> IM: CPT | Performed by: INTERNAL MEDICINE

## 2019-08-16 PROCEDURE — 90471 IMMUNIZATION ADMIN: CPT | Performed by: INTERNAL MEDICINE

## 2019-08-16 PROCEDURE — 99213 OFFICE O/P EST LOW 20 MIN: CPT | Performed by: INTERNAL MEDICINE

## 2019-08-16 RX ORDER — HYDROCODONE BITARTRATE AND ACETAMINOPHEN 5; 325 MG/1; MG/1
1 TABLET ORAL DAILY PRN
Qty: 10 TABLET | Refills: 0 | Status: SHIPPED | OUTPATIENT
Start: 2019-08-16 | End: 2019-08-27 | Stop reason: SDUPTHER

## 2019-08-16 RX ORDER — NAPROXEN SODIUM 220 MG
220 TABLET ORAL 2 TIMES DAILY PRN
COMMUNITY
End: 2021-09-01

## 2019-08-16 NOTE — PROGRESS NOTES
"Subjective  Andrea Woody is a 64 y.o. female    HPI coming in for follow-up chronic low back pain for more than 6 months now has been evaluated by neurosurgery  After an MRI showed evidence of symptomatic L2-L3 stenosis.  Subsequently was referred to pain specialist who is giving her an epidural steroid injection.  She had some relief initially she wants to try a pain specialist locally.  Tries to remain active currently not on any narcotic analgesics.  Is using NSAIDs as needed  The following portions of the patient's history were reviewed and updated as appropriate: allergies, current medications, past family history, past medical history, past social history, past surgical history, and problem list.     Review of Systems   Constitutional: Negative.  Negative for activity change, appetite change, fatigue and fever.   HENT: Negative for congestion, ear discharge, ear pain and trouble swallowing.    Eyes: Negative for photophobia and visual disturbance.   Respiratory: Negative for cough and shortness of breath.    Cardiovascular: Negative for chest pain and palpitations.   Gastrointestinal: Negative for abdominal distention, abdominal pain, constipation, diarrhea, nausea and vomiting.   Endocrine: Negative.    Genitourinary: Negative for dysuria, hematuria and urgency.   Musculoskeletal: Positive for back pain. Negative for arthralgias, joint swelling and myalgias.   Skin: Negative for color change and rash.   Allergic/Immunologic: Negative.    Neurological: Negative for dizziness, weakness, light-headedness and headaches.   Hematological: Negative for adenopathy. Does not bruise/bleed easily.   Psychiatric/Behavioral: Positive for sleep disturbance. Negative for agitation, confusion and dysphoric mood. The patient is not nervous/anxious.        Visit Vitals  /82 Comment: Automatic   Pulse 52   Temp 98.3 °F (36.8 °C) (Temporal)   Ht 175.3 cm (69\")   Wt 77 kg (169 lb 12.8 oz)   SpO2 96%   BMI 25.08 kg/m² "       Objective  Physical Exam   Constitutional: She is oriented to person, place, and time. She appears well-developed and well-nourished. No distress.   HENT:   Nose: Nose normal.   Mouth/Throat: Oropharynx is clear and moist.   Eyes: Conjunctivae and EOM are normal. No scleral icterus.   Neck: No tracheal deviation present. No thyromegaly present.   Cardiovascular: Normal rate and regular rhythm. Exam reveals no friction rub.   No murmur heard.  Pulmonary/Chest: No respiratory distress. She has no wheezes. She has no rales.   Abdominal: Soft. She exhibits no distension and no mass. There is no tenderness. There is no guarding.   Musculoskeletal: Normal range of motion. She exhibits no deformity.   Lymphadenopathy:     She has no cervical adenopathy.   Neurological: She is alert and oriented to person, place, and time. She has normal reflexes. No cranial nerve deficit. Coordination normal.   Skin: Skin is warm and dry. No rash noted. No erythema.   Psychiatric: She has a normal mood and affect. Her behavior is normal. Judgment and thought content normal.       Diagnoses and all orders for this visit:    Need for vaccination  -     Tdap Vaccine Greater Than or Equal To 8yo IM    Chronic midline low back pain without sciatica she is been on meloxicam 15 mg daily along with Aleve I have advised her to discontinue the Aleve.  Call placed to Dr. Benitez.  Discussed patient findings and radiological findings with him.  He will be seeing the patient within the next 5 days.  In the meantime I have given her hydrocodone to be taken on a as needed basis discussed the addictive potential.  Only 10 tablets prescribed

## 2019-08-28 RX ORDER — HYDROCODONE BITARTRATE AND ACETAMINOPHEN 5; 325 MG/1; MG/1
1 TABLET ORAL DAILY PRN
Qty: 20 TABLET | Refills: 0 | Status: SHIPPED | OUTPATIENT
Start: 2019-08-28 | End: 2019-11-22 | Stop reason: SDUPTHER

## 2019-09-17 ENCOUNTER — HOSPITAL ENCOUNTER (OUTPATIENT)
Facility: HOSPITAL | Age: 64
Setting detail: HOSPITAL OUTPATIENT SURGERY
Discharge: HOME | End: 2019-09-17
Attending: ANESTHESIOLOGY | Admitting: ANESTHESIOLOGY

## 2019-09-17 ENCOUNTER — APPOINTMENT (OUTPATIENT)
Dept: GENERAL RADIOLOGY | Facility: HOSPITAL | Age: 64
End: 2019-09-17

## 2019-09-17 VITALS
SYSTOLIC BLOOD PRESSURE: 185 MMHG | HEART RATE: 55 BPM | TEMPERATURE: 98.1 F | DIASTOLIC BLOOD PRESSURE: 85 MMHG | OXYGEN SATURATION: 100 % | RESPIRATION RATE: 16 BRPM

## 2019-09-17 DIAGNOSIS — M47.816 LUMBAR SPONDYLOSIS: Primary | ICD-10-CM

## 2019-09-17 PROCEDURE — 76000 FLUOROSCOPY <1 HR PHYS/QHP: CPT

## 2019-09-17 PROCEDURE — 25010000002 METHYLPREDNISOLONE PER 80 MG: Performed by: ANESTHESIOLOGY

## 2019-09-17 RX ORDER — BUPIVACAINE HYDROCHLORIDE 5 MG/ML
INJECTION, SOLUTION PERINEURAL AS NEEDED
Status: DISCONTINUED | OUTPATIENT
Start: 2019-09-17 | End: 2019-09-17 | Stop reason: HOSPADM

## 2019-09-17 RX ORDER — METHYLPREDNISOLONE ACETATE 80 MG/ML
INJECTION, SUSPENSION INTRA-ARTICULAR; INTRALESIONAL; INTRAMUSCULAR; SOFT TISSUE AS NEEDED
Status: DISCONTINUED | OUTPATIENT
Start: 2019-09-17 | End: 2019-09-17 | Stop reason: HOSPADM

## 2019-09-17 NOTE — H&P
[id number]  Andrea Woody  female  1955  Qamar Benitez,   9/17/2019  PATIENT NAME: Andrea Woody      BP: ()/()   Arterial Line BP: ()/()     Past Medical History:   Diagnosis Date   • Eczema    • Hemorrhoid    • Insomnia        Past Surgical History:   Procedure Laterality Date   • HYSTERECTOMY         Social History     Socioeconomic History   • Marital status:      Spouse name: Not on file   • Number of children: Not on file   • Years of education: Not on file   • Highest education level: Not on file   Occupational History   • Occupation: retired    Tobacco Use   • Smoking status: Never Smoker   • Smokeless tobacco: Never Used   Substance and Sexual Activity   • Alcohol use: Yes     Comment: occassional   • Drug use: No   • Sexual activity: Defer       Family History   Family history unknown: Yes       Medications Prior to Admission   Medication Sig Dispense Refill Last Dose   • aspirin 81 MG EC tablet Take 81 mg by mouth Daily.   Taking   • hydrochlorothiazide (MICROZIDE) 12.5 MG capsule Take 1 capsule by mouth Daily. 90 capsule 3 Taking   • HYDROcodone-acetaminophen (NORCO) 5-325 MG per tablet Take 1 tablet by mouth Daily As Needed for Severe Pain . 20 tablet 0    • meloxicam (MOBIC) 15 MG tablet Take 1 tablet by mouth Daily. 90 tablet 3 Taking   • naproxen sodium (ALEVE) 220 MG tablet Take 220 mg by mouth 2 (Two) Times a Day As Needed.   Taking        Within Normal Limits Abnormal   Mental Status [x]    []     Head, Neck, and Throat [x]   []     Chest/Lungs [x]   []     Cardiovascular [x]   []     Abdomen [x]   []     Extremities [x]   []     Neurologic [x]   []     Other       Vitals:    09/17/19 1550   BP: (!) 185/85   Pulse: 55   Resp: 16   Temp:    SpO2: 100%           Diagnosis: m47.816    Plan: LUMBAR MEDIAL BRANCH BLOCK BILATERAL  3 LEVELS L3/4, L4/5, L5/S1 (Bilateral)       STATEMENT AS TO REASON OF PLANNED OPERATION:  [x]   H&P revealed no contraindication to planned surgery.  (Check if correct).    [x]   Labs (if ordered) have been reviewed and revealed no contraindications to planned surgery. (Check if correct).      Qamar Benitez, DO  9/17/2019

## 2019-09-17 NOTE — DISCHARGE INSTRUCTIONS
You have received an injection of local anesthetic and/or corticosteroids. The local anesthetic effect typically last 2-6 hours. It may take 3-10 days for the corticosteroids to reach optimal effect.    Please pay close attention to the following information/instructions:    1. You may not drive for 12 hours after your injection.  2. It is common to experience mild soreness at the injection site(s) for 24-48 hours. Ice is the best remedy. You may apply ice for 20 minutes at a time several times    a day as needed.    3. Avoid heat to the injection area for 72 hours. No hot packs, saunas, or steam rooms during this time. A regular shower is OK.    4. You may restart your regular medication regimen, including pain medications, anti-inflammatory. Restart your blood thinner as directed by your physician.    5. Please remove the sterile dressing/band-aid tonight or tomorrow morning. Do not leave it on after you have taken a shower or gotten it wet.    6. Corticosteroid side effects can occur after this injection, but they usually resolve after several days. These side effects can include flushing, hot flashes, mild           palpitations, insomnia, water retention, feeling anxious/restless, or headaches.    7. While it is extremely rare to get an infection after a spinal procedure, please call the office if you develop any signs of infection. These signs include fevers/chills,    severely increased pain, redness at the injections site, or any drainage from the injection site.    8. Remember that the corticosteroid benefit (long-term pain relief) from an injection can take as long as 10 days to occur. You may have a period of slightly                  increased pain after your injection before the cortisone takes effect. In some cases the local anesthetic will provide the majority of your injection. Please note this relief as well.     9. You may resume all of your normal daily activities 24 hours after your  injection.    10. It is OK to restart your exercise or physical therapy program as soon as you feel comfortable doing so.    11. Please call our office if you do not know or have your follow-up appointment date.    12. Please note your pain throughout your post-injection period.

## 2019-10-04 ENCOUNTER — PREP FOR SURGERY (OUTPATIENT)
Dept: OTHER | Facility: HOSPITAL | Age: 64
End: 2019-10-04

## 2019-10-04 NOTE — H&P
[id number]  Andrea Woody  female  1955  Qamar Benitez DO  10/4/2019  PATIENT NAME: Andrea Woody           Past Medical History:   Diagnosis Date   • Eczema    • Hemorrhoid    • Insomnia        Past Surgical History:   Procedure Laterality Date   • HYSTERECTOMY     • MEDIAL BRANCH BLOCK Bilateral 9/17/2019    Procedure: LUMBAR MEDIAL BRANCH BLOCK BILATERAL  3 LEVELS L3/4, L4/5, L5/S1;  Surgeon: Qamar Benitez DO;  Location: [facility] ENDOSCOPY;  Service: Pain Management       Social History     Socioeconomic History   • Marital status:      Spouse name: Not on file   • Number of children: Not on file   • Years of education: Not on file   • Highest education level: Not on file   Occupational History   • Occupation: retired    Tobacco Use   • Smoking status: Never Smoker   • Smokeless tobacco: Never Used   Substance and Sexual Activity   • Alcohol use: Yes     Comment: occassional   • Drug use: No   • Sexual activity: Defer       Family History   Family history unknown: Yes         (Not in a hospital admission)      Review of Systems/Physical Exam:   Within Normal Limits Abnormal   Mental Status [x]    []     Head, Neck, and Throat [x]   []     Chest/Lungs [x]   []     Cardiovascular [x]   []     Abdomen [x]   []     Extremities [x]   []     Neurologic [x]   []     Other       Diagnosis: m47.816    Plan: * Surgery not found *       STATEMENT AS TO REASON OF PLANNED OPERATION:  [x]   H&P revealed no contraindication to planned surgery. (Check if correct).    [x]   Labs (if ordered) have been reviewed and revealed no contraindications to planned surgery. (Check if correct).      Qamar Benitez DO  10/4/2019

## 2019-10-08 PROBLEM — M47.816 LUMBAR SPONDYLOSIS: Status: ACTIVE | Noted: 2019-10-08

## 2019-10-14 NOTE — H&P
[id number]  Andrea Woody  female  1955  Qamar MIREYA DO Eli  10/14/2019  PATIENT NAME: Andrea Woody           Past Medical History:   Diagnosis Date   • Arthritis    • Chronic back pain    • Eczema    • Hypertension    • Insomnia        Past Surgical History:   Procedure Laterality Date   • COLONOSCOPY     • HYSTERECTOMY     • MEDIAL BRANCH BLOCK Bilateral 9/17/2019    Procedure: LUMBAR MEDIAL BRANCH BLOCK BILATERAL  3 LEVELS L3/4, L4/5, L5/S1;  Surgeon: Qamar Benitez DO;  Location: [facility] ENDOSCOPY;  Service: Pain Management       Social History     Socioeconomic History   • Marital status:      Spouse name: Not on file   • Number of children: Not on file   • Years of education: Not on file   • Highest education level: Not on file   Occupational History   • Occupation: retired    Tobacco Use   • Smoking status: Never Smoker   • Smokeless tobacco: Never Used   Substance and Sexual Activity   • Alcohol use: Yes     Comment: occassional   • Drug use: No   • Sexual activity: Defer       Family History   Family history unknown: Yes       No medications prior to admission.         Review of Systems/Physical Exam:   Within Normal Limits Abnormal   Mental Status [x]    []     Head, Neck, and Throat [x]   []     Chest/Lungs [x]   []     Cardiovascular [x]   []     Abdomen [x]   []     Extremities [x]   []     Neurologic [x]   []     Other         There were no vitals filed for this visit.    Review of Systems - as previously noted      Diagnosis: M47.812    Plan: LUMBAR MEDIAL BRANCH BLOCK BIlateral L3/4 L4/5, L5/S1 (Bilateral)       STATEMENT AS TO REASON OF PLANNED OPERATION:  [x]   H&P revealed no contraindication to planned surgery. (Check if correct).    [x]   Labs (if ordered) have been reviewed and revealed no contraindications to planned surgery. (Check if correct).      Qamar Benitez DO  10/14/2019

## 2019-10-15 ENCOUNTER — HOSPITAL ENCOUNTER (OUTPATIENT)
Facility: HOSPITAL | Age: 64
Setting detail: HOSPITAL OUTPATIENT SURGERY
Discharge: HOME | End: 2019-10-15
Attending: ANESTHESIOLOGY | Admitting: ANESTHESIOLOGY

## 2019-10-15 ENCOUNTER — ANESTHESIA (OUTPATIENT)
Dept: PERIOP | Facility: HOSPITAL | Age: 64
End: 2019-10-15

## 2019-10-15 ENCOUNTER — APPOINTMENT (OUTPATIENT)
Dept: GENERAL RADIOLOGY | Facility: HOSPITAL | Age: 64
End: 2019-10-15

## 2019-10-15 ENCOUNTER — ANESTHESIA EVENT (OUTPATIENT)
Dept: PERIOP | Facility: HOSPITAL | Age: 64
End: 2019-10-15

## 2019-10-15 VITALS
HEART RATE: 50 BPM | RESPIRATION RATE: 18 BRPM | DIASTOLIC BLOOD PRESSURE: 70 MMHG | TEMPERATURE: 97.2 F | BODY MASS INDEX: 23.91 KG/M2 | OXYGEN SATURATION: 96 % | SYSTOLIC BLOOD PRESSURE: 167 MMHG | WEIGHT: 167 LBS | HEIGHT: 70 IN

## 2019-10-15 PROCEDURE — 25010000002 FENTANYL CITRATE (PF) 100 MCG/2ML SOLUTION: Performed by: NURSE ANESTHETIST, CERTIFIED REGISTERED

## 2019-10-15 PROCEDURE — 25010000002 MIDAZOLAM PER 1 MG: Performed by: NURSE ANESTHETIST, CERTIFIED REGISTERED

## 2019-10-15 PROCEDURE — 25010000002 METHYLPREDNISOLONE PER 80 MG: Performed by: ANESTHESIOLOGY

## 2019-10-15 PROCEDURE — 76000 FLUOROSCOPY <1 HR PHYS/QHP: CPT

## 2019-10-15 RX ORDER — SODIUM CHLORIDE, SODIUM LACTATE, POTASSIUM CHLORIDE, CALCIUM CHLORIDE 600; 310; 30; 20 MG/100ML; MG/100ML; MG/100ML; MG/100ML
1000 INJECTION, SOLUTION INTRAVENOUS CONTINUOUS
Status: DISCONTINUED | OUTPATIENT
Start: 2019-10-15 | End: 2019-10-15 | Stop reason: HOSPADM

## 2019-10-15 RX ORDER — METHYLPREDNISOLONE ACETATE 80 MG/ML
INJECTION, SUSPENSION INTRA-ARTICULAR; INTRALESIONAL; INTRAMUSCULAR; SOFT TISSUE AS NEEDED
Status: DISCONTINUED | OUTPATIENT
Start: 2019-10-15 | End: 2019-10-15 | Stop reason: HOSPADM

## 2019-10-15 RX ORDER — FENTANYL CITRATE 50 UG/ML
INJECTION, SOLUTION INTRAMUSCULAR; INTRAVENOUS AS NEEDED
Status: DISCONTINUED | OUTPATIENT
Start: 2019-10-15 | End: 2019-10-15 | Stop reason: SURG

## 2019-10-15 RX ORDER — BUPIVACAINE HYDROCHLORIDE 5 MG/ML
INJECTION, SOLUTION PERINEURAL AS NEEDED
Status: DISCONTINUED | OUTPATIENT
Start: 2019-10-15 | End: 2019-10-15 | Stop reason: HOSPADM

## 2019-10-15 RX ORDER — MIDAZOLAM HYDROCHLORIDE 1 MG/ML
INJECTION INTRAMUSCULAR; INTRAVENOUS AS NEEDED
Status: DISCONTINUED | OUTPATIENT
Start: 2019-10-15 | End: 2019-10-15 | Stop reason: SURG

## 2019-10-15 RX ADMIN — MIDAZOLAM HYDROCHLORIDE 1 MG: 1 INJECTION, SOLUTION INTRAMUSCULAR; INTRAVENOUS at 14:38

## 2019-10-15 RX ADMIN — FENTANYL CITRATE 50 MCG: 50 INJECTION INTRAMUSCULAR; INTRAVENOUS at 14:38

## 2019-10-15 RX ADMIN — SODIUM CHLORIDE, POTASSIUM CHLORIDE, SODIUM LACTATE AND CALCIUM CHLORIDE 1000 ML: 600; 310; 30; 20 INJECTION, SOLUTION INTRAVENOUS at 13:17

## 2019-10-15 NOTE — DISCHARGE INSTRUCTIONS
You have received an injection of local anesthetic and/or corticosteroids. The local anesthetic effect typically last 2-6 hours. It may take 3-10 days for the corticosteroids to reach optimal effect.    Please pay close attention to the following information/instructions:    You may not drive for 12 hours after your injection.    It is common to experience mild soreness at the injection site(s) for 24-48 hours. Ice is the best remedy. You may apply ice for 20 minutes at a time several times a day as needed.    Avoid heat to the injection area for 72 hours. No hot packs, saunas, or steam rooms during this time. A regular shower is OK.    You may restart your regular medication regimen, including pain medications, anti-inflammatory. Restart your blood thinner as directed by your physician.    Please remove the sterile dressing/band-aid tonight or tomorrow morning. Do not leave it on after you have taken a shower or gotten it wet.    Corticosteroid side effects can occur after this injection, but they usually resolve   after several days. These side effects can include flushing, hot flashes, mild palpitations, insomnia, water retention, feeling anxious/restless, or headaches.    While it is extremely rare to get an infection after a spinal procedure, please call the office if you develop any signs of infection. These signs include fevers/chills, severely increased pain, redness at the injections site, or any drainage from the injection site.    Remember that the corticosteroid benefit (long-term pain relief) from an injection can take as long as 10 days to occur. You may have a period of slightly increased pain after your injection before the cortisone takes effect. In some cases the local anesthetic will provide the majority of your injection. Please note this relief as well.     You may resume all of your normal daily activities 24 hours after your injection.    It is OK to restart your exercise or physical therapy  program as soon as you feel comfortable doing so.    Please call our office if you do not know or have your follow-up appointment date.    Please note your pain throughout your post-injection period.  ************************************************************************************************************  No pushing, pulling, tugging,  heavy lifting, or strenuous activity.  No major decision making, driving, or drinking alcoholic beverages for 24 hours. ( due to the medications you have  received)  Always use good hand hygiene/washing techniques.  NO driving while taking pain medications.    * if you have an incision:  Check your incision area every day for signs of infection.   Check for:  * more redness, swelling, or pain  *more fluid or blood  *warmth  *pus or bad smell    To assist you in voiding:  Drink plenty of fluids  Listen to running water while attempting to void.    If you are unable to urinate and you have an uncomfortable urge to void or it has been   6 hours since you were discharged, return to the Emergency Room

## 2019-10-15 NOTE — OP NOTE
Informed consent was obtained after a discussion of risks, benefits and alternatives to the procedure. Patient agreed to proceed by signing the procedure consent. The patient was placed in a prone position on the fluoroscopy table. Blood pressure, heart rate, and pulse oximetry were monitored throughout the procedure.     Skin overlying the lumbar region was cleansed with ethyl alcohol and chlorahexidine solution.  Next using A/P fluoroscopic views a 25-gauge by 3 1/2 inch spinal needle was advanced to make contact with notch of the sacral ala.  Following this, under fluoroscopic views needles were advanced into the junction of the superior articular process and transverse process at the L3, L4 and L5 levels on left sides and positioned with the needle tips just posterior to a line connecting the neuroforaminal space.  Each level was then injected with a mixture of Methylprednisolone and 1cc of 0.5% Bupivicaine.  The needles were then removed intact.  The procedure was then repeated on the right in a similar manner with the same injectate.  Procedure was repeated on the contralateral side in the same manner. 80mg total of methylprednisolone were used.       The patient was monitored for adverse allergic, hemodynamic, or neurologic symptoms after the procedure. The patient tolerated the procedure well and there were no apparent complications. Vital signs remained stable throughout the procedure. The patient was taken to the recovery area where written discharge instructions for the procedure were given. The patient was discharged home.    NOTE: Universal Sterile Protocol was observed throughout the entire procedure and this patient has been educated prior to the performance of this procedure.    CPT 39922,50  72807,50  45333,50      Qamar Benitez,   10/15/19

## 2019-10-15 NOTE — ANESTHESIA POSTPROCEDURE EVALUATION
Patient: Andrea Woody    Procedure Summary     Date:  10/15/19 Room / Location:  [facility] OR 3 /  [facility]    Anesthesia Start:  1436 Anesthesia Stop:      Procedure:  LUMBAR MEDIAL BRANCH BLOCK BIlateral L3/4 L4/5, L5/S1 (Bilateral ) Diagnosis:       Lumbar spondylosis      (Lumbar spondylosis [M47.816])    Surgeon:  Qamar Benitez DO Provider:  Crispin Holman CRNA    Anesthesia Type:  MAC ASA Status:  2          Anesthesia Type: MAC  Last vitals  BP   133/71   Temp   97   Pulse   51   Resp   16   SpO2   100     Post Anesthesia Care and Evaluation    Patient location during evaluation: bedside  Patient participation: complete - patient participated  Level of consciousness: awake and alert  Pain score: 0  Pain management: adequate  Airway patency: patent  Anesthetic complications: No anesthetic complications  PONV Status: none  Cardiovascular status: acceptable  Respiratory status: acceptable  Hydration status: acceptable

## 2019-10-15 NOTE — ANESTHESIA PREPROCEDURE EVALUATION
Anesthesia Evaluation     Patient summary reviewed and Nursing notes reviewed   no history of anesthetic complications:  NPO Solid Status: > 8 hours  NPO Liquid Status: > 8 hours           Airway   Mallampati: I  TM distance: >3 FB  Neck ROM: full  No difficulty expected  Dental - normal exam     Pulmonary - negative pulmonary ROS and normal exam   Cardiovascular - normal exam    (+) hypertension,       Neuro/Psych  (+) psychiatric history Anxiety and Depression,     GI/Hepatic/Renal/Endo - negative ROS     Musculoskeletal     Abdominal  - normal exam    Bowel sounds: normal.   Substance History   (+) alcohol use,      OB/GYN negative ob/gyn ROS         Other   (+) arthritis                     Anesthesia Plan    ASA 2     MAC   (Risks and benefits discussed including risk of aspiration, recall and dental damage. All patient questions answered. Will continue with POC.)  intravenous induction   Anesthetic plan, all risks, benefits, and alternatives have been provided, discussed and informed consent has been obtained with: patient.

## 2019-11-25 RX ORDER — HYDROCODONE BITARTRATE AND ACETAMINOPHEN 5; 325 MG/1; MG/1
1 TABLET ORAL DAILY PRN
Qty: 20 TABLET | Refills: 0 | Status: SHIPPED | OUTPATIENT
Start: 2019-11-25

## 2019-11-27 RX ORDER — HYDROCHLOROTHIAZIDE 12.5 MG/1
12.5 CAPSULE, GELATIN COATED ORAL DAILY
Qty: 90 CAPSULE | Refills: 3 | Status: CANCELLED | OUTPATIENT
Start: 2019-11-27

## 2019-12-02 ENCOUNTER — TELEPHONE (OUTPATIENT)
Dept: INTERNAL MEDICINE | Facility: CLINIC | Age: 64
End: 2019-12-02

## 2019-12-02 RX ORDER — HYDROCHLOROTHIAZIDE 12.5 MG/1
12.5 CAPSULE, GELATIN COATED ORAL DAILY
Qty: 90 CAPSULE | Refills: 3 | Status: SHIPPED | OUTPATIENT
Start: 2019-12-02

## 2019-12-02 NOTE — TELEPHONE ENCOUNTER
Patient needing refill on the following, she has been out for over a week:    hydrochlorothiazide (MICROZIDE) 12.5 MG capsule     Sig - Route: Take 1 capsule by mouth Daily. - Oral     Pharmacy   [facility] [place] [place] [address]  - [phone]  - [phone] FX

## 2019-12-02 NOTE — TELEPHONE ENCOUNTER
Prescription sent to pharmacy.  Called patient and left a voicemail informing her prescription had been sent.

## 2019-12-17 ENCOUNTER — PREP FOR SURGERY (OUTPATIENT)
Dept: OTHER | Facility: HOSPITAL | Age: 64
End: 2019-12-17

## 2019-12-17 DIAGNOSIS — M47.816 LUMBAR SPONDYLOSIS: Primary | ICD-10-CM

## 2019-12-17 NOTE — H&P
[id number]  Andrea Woody  female  1955  Qamar Benitez DO  12/17/2019  PATIENT NAME: Andrea Woody      BP: ()/()   Arterial Line BP: ()/()     Past Medical History:   Diagnosis Date   • Arthritis    • Chronic back pain    • Eczema    • Hypertension    • Insomnia        Past Surgical History:   Procedure Laterality Date   • COLONOSCOPY     • HYSTERECTOMY     • MEDIAL BRANCH BLOCK Bilateral 9/17/2019    Procedure: LUMBAR MEDIAL BRANCH BLOCK BILATERAL  3 LEVELS L3/4, L4/5, L5/S1;  Surgeon: Qamar Benitez DO;  Location: [facility] ENDOSCOPY;  Service: Pain Management   • MEDIAL BRANCH BLOCK Bilateral 10/15/2019    Procedure: LUMBAR MEDIAL BRANCH BLOCK BIlateral L3/4 L4/5, L5/S1;  Surgeon: Qamar Benitez DO;  Location: [facility] OR;  Service: Pain Management       Social History     Socioeconomic History   • Marital status:      Spouse name: Not on file   • Number of children: Not on file   • Years of education: Not on file   • Highest education level: Not on file   Occupational History   • Occupation: retired    Tobacco Use   • Smoking status: Never Smoker   • Smokeless tobacco: Never Used   Substance and Sexual Activity   • Alcohol use: Yes     Comment: occassional   • Drug use: No   • Sexual activity: Defer       Family History   Family history unknown: Yes         (Not in a hospital admission)      Review of Systems/Physical Exam:   Within Normal Limits Abnormal   Mental Status [x]    []     Head, Neck, and Throat [x]   []     Chest/Lungs [x]   []     Cardiovascular [x]   []     Abdomen [x]   []     Extremities [x]   []     Neurologic [x]   []     Other         There were no vitals filed for this visit.    Review of Systems - as previously noted      Diagnosis: m47.816    Plan: RADIOFREQUENCY ABLATION LUMBAR l3\x1ancer (Right)       STATEMENT AS TO REASON OF PLANNED OPERATION:  [x]   H&P revealed no contraindication to planned surgery. (Check if correct).    [x]   Labs (if ordered) have been  reviewed and revealed no contraindications to planned surgery. (Check if correct).      Qamar Benitez, DO  12/17/2019

## 2019-12-31 ENCOUNTER — PREP FOR SURGERY (OUTPATIENT)
Dept: OTHER | Facility: HOSPITAL | Age: 64
End: 2019-12-31

## 2020-01-02 ENCOUNTER — PREP FOR SURGERY (OUTPATIENT)
Dept: OTHER | Facility: HOSPITAL | Age: 65
End: 2020-01-02

## 2020-01-02 DIAGNOSIS — M47.816 LUMBAR SPONDYLOSIS: Primary | ICD-10-CM

## 2020-01-02 NOTE — H&P (VIEW-ONLY)
[id number]  Andrea Woody  female  1955  Qamar Benitez DO  1/2/2020  PATIENT NAME: Andrea Woody      BP: ()/()   Arterial Line BP: ()/()     Past Medical History:   Diagnosis Date   • Arthritis    • Chronic back pain    • Eczema    • Hypertension    • Insomnia        Past Surgical History:   Procedure Laterality Date   • COLONOSCOPY     • HYSTERECTOMY     • MEDIAL BRANCH BLOCK Bilateral 9/17/2019    Procedure: LUMBAR MEDIAL BRANCH BLOCK BILATERAL  3 LEVELS L3/4, L4/5, L5/S1;  Surgeon: Qamar Benitez DO;  Location: [facility] ENDOSCOPY;  Service: Pain Management   • MEDIAL BRANCH BLOCK Bilateral 10/15/2019    Procedure: LUMBAR MEDIAL BRANCH BLOCK BIlateral L3/4 L4/5, L5/S1;  Surgeon: Qamar Benitez DO;  Location: [facility] OR;  Service: Pain Management       Social History     Socioeconomic History   • Marital status:      Spouse name: Not on file   • Number of children: Not on file   • Years of education: Not on file   • Highest education level: Not on file   Occupational History   • Occupation: retired    Tobacco Use   • Smoking status: Never Smoker   • Smokeless tobacco: Never Used   Substance and Sexual Activity   • Alcohol use: Yes     Comment: occassional   • Drug use: No   • Sexual activity: Defer       Family History   Family history unknown: Yes         (Not in a hospital admission)      Review of Systems/Physical Exam:   Within Normal Limits Abnormal   Mental Status [x]    []     Head, Neck, and Throat [x]   []     Chest/Lungs [x]   []     Cardiovascular [x]   []     Abdomen [x]   []     Extremities [x]   []     Neurologic [x]   []     Other         There were no vitals filed for this visit.    Review of Systems - as previously noted      Diagnosis: m47.816    Plan: RADIOFREQUENCY ABLATION LUMBAR L3-S1 left       STATEMENT AS TO REASON OF PLANNED OPERATION:  [x]   H&P revealed no contraindication to planned surgery. (Check if correct).    [x]   Labs (if ordered) have  been reviewed and revealed no contraindications to planned surgery. (Check if correct).      Qamar Benitez, DO  1/2/2020

## 2020-01-02 NOTE — H&P
[id number]  Andrea Woody  female  1955  Qamar Benitez DO  1/2/2020  PATIENT NAME: Andrea Woody      BP: ()/()   Arterial Line BP: ()/()     Past Medical History:   Diagnosis Date   • Arthritis    • Chronic back pain    • Eczema    • Hypertension    • Insomnia        Past Surgical History:   Procedure Laterality Date   • COLONOSCOPY     • HYSTERECTOMY     • MEDIAL BRANCH BLOCK Bilateral 9/17/2019    Procedure: LUMBAR MEDIAL BRANCH BLOCK BILATERAL  3 LEVELS L3/4, L4/5, L5/S1;  Surgeon: Qamar Benitez DO;  Location: [facility] ENDOSCOPY;  Service: Pain Management   • MEDIAL BRANCH BLOCK Bilateral 10/15/2019    Procedure: LUMBAR MEDIAL BRANCH BLOCK BIlateral L3/4 L4/5, L5/S1;  Surgeon: Qamar Benitez DO;  Location: [facility] OR;  Service: Pain Management       Social History     Socioeconomic History   • Marital status:      Spouse name: Not on file   • Number of children: Not on file   • Years of education: Not on file   • Highest education level: Not on file   Occupational History   • Occupation: retired    Tobacco Use   • Smoking status: Never Smoker   • Smokeless tobacco: Never Used   Substance and Sexual Activity   • Alcohol use: Yes     Comment: occassional   • Drug use: No   • Sexual activity: Defer       Family History   Family history unknown: Yes         (Not in a hospital admission)      Review of Systems/Physical Exam:   Within Normal Limits Abnormal   Mental Status [x]    []     Head, Neck, and Throat [x]   []     Chest/Lungs [x]   []     Cardiovascular [x]   []     Abdomen [x]   []     Extremities [x]   []     Neurologic [x]   []     Other         There were no vitals filed for this visit.    Review of Systems - as previously noted      Diagnosis: m47.816    Plan: RADIOFREQUENCY ABLATION LUMBAR L3-S1 left       STATEMENT AS TO REASON OF PLANNED OPERATION:  [x]   H&P revealed no contraindication to planned surgery. (Check if correct).    [x]   Labs (if ordered) have  been reviewed and revealed no contraindications to planned surgery. (Check if correct).      Qamar Benitez, DO  1/2/2020

## 2020-01-07 ENCOUNTER — HOSPITAL ENCOUNTER (OUTPATIENT)
Facility: HOSPITAL | Age: 65
Setting detail: HOSPITAL OUTPATIENT SURGERY
Discharge: HOME | End: 2020-01-07
Attending: ANESTHESIOLOGY | Admitting: ANESTHESIOLOGY

## 2020-01-07 ENCOUNTER — ANESTHESIA EVENT (OUTPATIENT)
Dept: PERIOP | Facility: HOSPITAL | Age: 65
End: 2020-01-07

## 2020-01-07 ENCOUNTER — APPOINTMENT (OUTPATIENT)
Dept: GENERAL RADIOLOGY | Facility: HOSPITAL | Age: 65
End: 2020-01-07

## 2020-01-07 ENCOUNTER — ANESTHESIA (OUTPATIENT)
Dept: PERIOP | Facility: HOSPITAL | Age: 65
End: 2020-01-07

## 2020-01-07 VITALS
TEMPERATURE: 98.6 F | DIASTOLIC BLOOD PRESSURE: 70 MMHG | BODY MASS INDEX: 23.91 KG/M2 | WEIGHT: 167 LBS | RESPIRATION RATE: 16 BRPM | OXYGEN SATURATION: 100 % | HEIGHT: 70 IN | HEART RATE: 49 BPM | SYSTOLIC BLOOD PRESSURE: 182 MMHG

## 2020-01-07 PROCEDURE — 25010000002 MIDAZOLAM PER 1MG: Performed by: NURSE ANESTHETIST, CERTIFIED REGISTERED

## 2020-01-07 PROCEDURE — 25010000002 FENTANYL CITRATE (PF) 100 MCG/2ML SOLUTION: Performed by: NURSE ANESTHETIST, CERTIFIED REGISTERED

## 2020-01-07 PROCEDURE — 25010000002 METHYLPREDNISOLONE PER 80 MG: Performed by: ANESTHESIOLOGY

## 2020-01-07 PROCEDURE — 76000 FLUOROSCOPY <1 HR PHYS/QHP: CPT

## 2020-01-07 RX ORDER — MIDAZOLAM HYDROCHLORIDE 2 MG/2ML
INJECTION, SOLUTION INTRAMUSCULAR; INTRAVENOUS AS NEEDED
Status: DISCONTINUED | OUTPATIENT
Start: 2020-01-07 | End: 2020-01-07 | Stop reason: SURG

## 2020-01-07 RX ORDER — SODIUM CHLORIDE, SODIUM LACTATE, POTASSIUM CHLORIDE, CALCIUM CHLORIDE 600; 310; 30; 20 MG/100ML; MG/100ML; MG/100ML; MG/100ML
1000 INJECTION, SOLUTION INTRAVENOUS CONTINUOUS
Status: DISCONTINUED | OUTPATIENT
Start: 2020-01-07 | End: 2020-01-07 | Stop reason: HOSPADM

## 2020-01-07 RX ORDER — BUPIVACAINE HYDROCHLORIDE 5 MG/ML
INJECTION, SOLUTION EPIDURAL; INTRACAUDAL AS NEEDED
Status: DISCONTINUED | OUTPATIENT
Start: 2020-01-07 | End: 2020-01-07 | Stop reason: HOSPADM

## 2020-01-07 RX ORDER — METHYLPREDNISOLONE ACETATE 80 MG/ML
INJECTION, SUSPENSION INTRA-ARTICULAR; INTRALESIONAL; INTRAMUSCULAR; SOFT TISSUE AS NEEDED
Status: DISCONTINUED | OUTPATIENT
Start: 2020-01-07 | End: 2020-01-07 | Stop reason: HOSPADM

## 2020-01-07 RX ORDER — LIDOCAINE HYDROCHLORIDE 20 MG/ML
INJECTION, SOLUTION INFILTRATION; PERINEURAL AS NEEDED
Status: DISCONTINUED | OUTPATIENT
Start: 2020-01-07 | End: 2020-01-07 | Stop reason: HOSPADM

## 2020-01-07 RX ORDER — FENTANYL CITRATE 50 UG/ML
INJECTION, SOLUTION INTRAMUSCULAR; INTRAVENOUS AS NEEDED
Status: DISCONTINUED | OUTPATIENT
Start: 2020-01-07 | End: 2020-01-07 | Stop reason: SURG

## 2020-01-07 RX ORDER — SODIUM CHLORIDE 0.9 % (FLUSH) 0.9 %
10 SYRINGE (ML) INJECTION AS NEEDED
Status: DISCONTINUED | OUTPATIENT
Start: 2020-01-07 | End: 2020-01-07 | Stop reason: HOSPADM

## 2020-01-07 RX ADMIN — MIDAZOLAM HYDROCHLORIDE 2 MG: 1 INJECTION, SOLUTION INTRAMUSCULAR; INTRAVENOUS at 13:35

## 2020-01-07 RX ADMIN — FENTANYL CITRATE 50 MCG: 50 INJECTION INTRAMUSCULAR; INTRAVENOUS at 13:41

## 2020-01-07 RX ADMIN — SODIUM CHLORIDE, POTASSIUM CHLORIDE, SODIUM LACTATE AND CALCIUM CHLORIDE 1000 ML: 600; 310; 30; 20 INJECTION, SOLUTION INTRAVENOUS at 12:25

## 2020-01-07 RX ADMIN — FENTANYL CITRATE 50 MCG: 50 INJECTION INTRAMUSCULAR; INTRAVENOUS at 13:47

## 2020-01-07 NOTE — ANESTHESIA PREPROCEDURE EVALUATION
Anesthesia Evaluation     Patient summary reviewed and Nursing notes reviewed   NPO Solid Status: > 8 hours  NPO Liquid Status: > 8 hours           Airway   Mallampati: II  TM distance: >3 FB  Neck ROM: full  No difficulty expected  Dental      Pulmonary    Cardiovascular     (+) hypertension,       Neuro/Psych  (+) psychiatric history Anxiety and Depression,     GI/Hepatic/Renal/Endo      Musculoskeletal     (+) back pain, chronic pain,   Abdominal    Substance History      OB/GYN          Other   arthritis,                    Anesthesia Plan    ASA 2     MAC     intravenous induction     Anesthetic plan, all risks, benefits, and alternatives have been provided, discussed and informed consent has been obtained with: patient.    Plan discussed with CRNA.

## 2020-01-07 NOTE — INTERVAL H&P NOTE
H&P updated. The patient was examined and the following changes are noted:  it is a Right Side RFA

## 2020-01-07 NOTE — OP NOTE
The risk and benefits of the procedure were discussed with patient, as well as alternative therapy options. Patient verbalized an understood and agreed to proceed.     Right side RFA    The patient was brought into the fluoroscopy suite and placed in a prone position on the procedure table. The patient's blood pressure, heart rate, and pulse oximetry were monitored throughout the procedure. The skin overlying the lumbar region was cleansed with ethyl alcohol followed by a chlorohexidine solution and allowed to dry. Next, sterile towels were placed around the sterile field, and sterile technique was maintained throughout the procedure.  The skin and soft tissue at the injection sites were then infiltrated with 4 mL of 1% Lidocaine for local anesthesia.  Using A/P fluoroscopic views a 20 gauge curved SMK needle was advanced to make contact with notch of the sacral ala.  Following this, using oblique fluoroscopic views SMK needles were advanced into the junction of the superior articular process and transverse process at the L3, L4 and L5 levels on the affected side and positioned with the needle tips just posterior to a line connecting the neuroforaminal space. Motor testing was then conducted at 2 Hz up to 2.5 volts.  There was no motor response elicited at any of the levels. Following this each level was injected with 1 mL of a mixture of 50/50 of 1% Lidocaine and 0.5% bupivacaine mixed with Depomed. After local anesthetic pretreatment, radiofrequency lesioning was performed at 85 degrees Celsius for 90 seconds at each level. The needles were removed intact. A total of 40mg methylprednisolone was used.    The patient was taken to the recovery area in stable condition.  There were no complications of the procedure noted.      CPT   30484  11396, x2    Qamar Benitez DO  01/07/20

## 2020-01-07 NOTE — ANESTHESIA POSTPROCEDURE EVALUATION
Patient: Andrea Woody    Procedure Summary     Date:  01/07/20 Room / Location:  [facility] OR 3 /  [facility]    Anesthesia Start:  1330 Anesthesia Stop:  1356    Procedure:  RADIOFREQUENCY ABLATION LUMBAR L3-S1 RIGHT (Left ) Diagnosis:       Lumbar spondylosis      (Lumbar spondylosis [M47.816])    Surgeon:  Qamar Benitez DO Provider:  Antonia Simmons CRNA    Anesthesia Type:  MAC ASA Status:  2          Anesthesia Type: MAC    Vitals  Vitals Value Taken Time   /70 1/7/2020  2:22 PM   Temp 98.6 °F (37 °C) 1/7/2020  1:58 PM   Pulse 49 1/7/2020  2:22 PM   Resp 16 1/7/2020  2:22 PM   SpO2 100 % 1/7/2020  2:22 PM           Post Anesthesia Care and Evaluation    Patient location during evaluation: PHASE II  Patient participation: complete - patient participated  Level of consciousness: awake and alert  Pain score: 0  Pain management: satisfactory to patient  Airway patency: patent  Anesthetic complications: No anesthetic complications  PONV Status: none  Cardiovascular status: acceptable and stable  Respiratory status: acceptable  Hydration status: acceptable

## 2020-01-07 NOTE — DISCHARGE INSTRUCTIONS
No pushing, pulling, tugging,  heavy lifting, or strenuous activity.  No major decision making, driving, or drinking alcoholic beverages for 24 hours. ( due to the medications you have  received)  Always use good hand hygiene/washing techniques.  NO driving while taking pain medications.    * if you have an incision:  Check your incision area every day for signs of infection.   Check for:  * more redness, swelling, or pain  *more fluid or blood  *warmth  *pus or bad smell    To assist you in voiding:  Drink plenty of fluids  Listen to running water while attempting to void.    If you are unable to urinate and you have an uncomfortable urge to void or it has been   6 hours since you were discharged, return to the Emergency Room        You have received an injection of local anesthetic and/or corticosteroids. The local anesthetic effect typically last 2-6 hours. It may take 3-10 days for the corticosteroids to reach optimal effect.    Please pay close attention to the following information/instructions:    You may not drive for 12 hours after your injection.    It is common to experience mild soreness at the injection site(s) for 24-48 hours. Ice is the best remedy. You may apply ice for 20 minutes at a time several times a day as needed.    Avoid heat to the injection area for 72 hours. No hot packs, saunas, or steam rooms during this time. A regular shower is OK.    You may restart your regular medication regimen, including pain medications, anti-inflammatory. Restart your blood thinner as directed by your physician.    Please remove the sterile dressing/band-aid tonight or tomorrow morning. Do not leave it on after you have taken a shower or gotten it wet.    Corticosteroid side effects can occur after this injection, but they usually resolve   after several days. These side effects can include flushing, hot flashes, mild palpitations, insomnia, water retention, feeling anxious/restless, or headaches.    While it  is extremely rare to get an infection after a spinal procedure, please call the office if you develop any signs of infection. These signs include fevers/chills, severely increased pain, redness at the injections site, or any drainage from the injection site.    Remember that the corticosteroid benefit (long-term pain relief) from an injection can take as long as 10 days to occur. You may have a period of slightly increased pain after your injection before the cortisone takes effect. In some cases the local anesthetic will provide the majority of your injection. Please note this relief as well.     You may resume all of your normal daily activities 24 hours after your injection.    It is OK to restart your exercise or physical therapy program as soon as you feel comfortable doing so.    Please call our office if you do not know or have your follow-up appointment date.    Please note your pain throughout your post-injection period.

## 2020-01-16 ENCOUNTER — PREP FOR SURGERY (OUTPATIENT)
Dept: OTHER | Facility: HOSPITAL | Age: 65
End: 2020-01-16

## 2020-01-16 DIAGNOSIS — M47.816 LUMBAR SPONDYLOSIS: Primary | ICD-10-CM

## 2020-01-16 NOTE — H&P
[id number]  Andrea Woody  female  1955  Qamar Benitez DO  1/16/2020  PATIENT NAME: Andrea Woody           Past Medical History:   Diagnosis Date   • Arthritis    • Chronic back pain    • Eczema    • Hypertension    • Insomnia        Past Surgical History:   Procedure Laterality Date   • COLONOSCOPY     • HYSTERECTOMY     • MEDIAL BRANCH BLOCK Bilateral 9/17/2019    Procedure: LUMBAR MEDIAL BRANCH BLOCK BILATERAL  3 LEVELS L3/4, L4/5, L5/S1;  Surgeon: Qamar Benitez DO;  Location: [facility] ENDOSCOPY;  Service: Pain Management   • MEDIAL BRANCH BLOCK Bilateral 10/15/2019    Procedure: LUMBAR MEDIAL BRANCH BLOCK BIlateral L3/4 L4/5, L5/S1;  Surgeon: Qamar Benitez DO;  Location: [facility] OR;  Service: Pain Management   • RADIOFREQUENCY ABLATION Left 1/7/2020    Procedure: RADIOFREQUENCY ABLATION LUMBAR L3-S1 RIGHT;  Surgeon: Qamar Benitez DO;  Location: [facility] OR;  Service: Pain Management       Social History     Socioeconomic History   • Marital status:      Spouse name: Not on file   • Number of children: Not on file   • Years of education: Not on file   • Highest education level: Not on file   Occupational History   • Occupation: retired    Tobacco Use   • Smoking status: Never Smoker   • Smokeless tobacco: Never Used   Substance and Sexual Activity   • Alcohol use: Yes     Comment: occassional   • Drug use: No   • Sexual activity: Defer       Family History   Family history unknown: Yes         (Not in a hospital admission)      Review of Systems/Physical Exam:   Within Normal Limits Abnormal   Mental Status [x]    []     Head, Neck, and Throat [x]   []     Chest/Lungs [x]   []     Cardiovascular [x]   []     Abdomen [x]   []     Extremities [x]   []     Neurologic [x]   []     Other         There were no vitals filed for this visit.    Review of Systems - as previously noted      Diagnosis: m47.816    PlanRADIOFREQUENCY ABLATION LUMBAR  right side L/S L3/S1 BL  L/S radio frequency ablation      STATEMENT AS TO REASON OF PLANNED OPERATION:  [x]   H&P revealed no contraindication to planned surgery. (Check if correct).    [x]   Labs (if ordered) have been reviewed and revealed no contraindications to planned surgery. (Check if correct).      Qamar Benitez, DO  1/16/2020

## 2021-05-25 ENCOUNTER — TRANSCRIBE ORDERS (OUTPATIENT)
Dept: ADMINISTRATIVE | Facility: HOSPITAL | Age: 66
End: 2021-05-25

## 2021-05-25 DIAGNOSIS — Z12.31 VISIT FOR SCREENING MAMMOGRAM: Primary | ICD-10-CM

## 2021-07-13 ENCOUNTER — HOSPITAL ENCOUNTER (OUTPATIENT)
Dept: MAMMOGRAPHY | Facility: HOSPITAL | Age: 66
Discharge: HOME | End: 2021-07-13
Admitting: FAMILY MEDICINE

## 2021-07-13 DIAGNOSIS — Z12.31 VISIT FOR SCREENING MAMMOGRAM: ICD-10-CM

## 2021-07-13 PROCEDURE — 77063 BREAST TOMOSYNTHESIS BI: CPT

## 2021-07-13 PROCEDURE — 77067 SCR MAMMO BI INCL CAD: CPT

## 2021-09-01 ENCOUNTER — OFFICE VISIT (OUTPATIENT)
Dept: ORTHOPEDIC SURGERY | Facility: CLINIC | Age: 66
End: 2021-09-01

## 2021-09-01 VITALS — BODY MASS INDEX: 24.41 KG/M2 | TEMPERATURE: 97 F | HEIGHT: 69 IN | WEIGHT: 164.8 LBS

## 2021-09-01 DIAGNOSIS — M75.51 SUBACROMIAL BURSITIS OF RIGHT SHOULDER JOINT: ICD-10-CM

## 2021-09-01 DIAGNOSIS — M25.511 ARTHRALGIA OF RIGHT SHOULDER REGION: Primary | ICD-10-CM

## 2021-09-01 DIAGNOSIS — M19.019 AC JOINT ARTHROPATHY: ICD-10-CM

## 2021-09-01 DIAGNOSIS — M19.011 PRIMARY OSTEOARTHRITIS OF RIGHT SHOULDER: ICD-10-CM

## 2021-09-01 DIAGNOSIS — M12.811 ROTATOR CUFF ARTHROPATHY OF RIGHT SHOULDER: ICD-10-CM

## 2021-09-01 PROCEDURE — 20605 DRAIN/INJ JOINT/BURSA W/O US: CPT | Performed by: PHYSICIAN ASSISTANT

## 2021-09-01 PROCEDURE — 99203 OFFICE O/P NEW LOW 30 MIN: CPT | Performed by: PHYSICIAN ASSISTANT

## 2021-09-01 PROCEDURE — 20610 DRAIN/INJ JOINT/BURSA W/O US: CPT | Performed by: PHYSICIAN ASSISTANT

## 2021-09-01 RX ORDER — TRIAMCINOLONE ACETONIDE 40 MG/ML
20 INJECTION, SUSPENSION INTRA-ARTICULAR; INTRAMUSCULAR
Status: COMPLETED | OUTPATIENT
Start: 2021-09-01 | End: 2021-09-01

## 2021-09-01 RX ORDER — TRIAMCINOLONE ACETONIDE 40 MG/ML
40 INJECTION, SUSPENSION INTRA-ARTICULAR; INTRAMUSCULAR
Status: COMPLETED | OUTPATIENT
Start: 2021-09-01 | End: 2021-09-01

## 2021-09-01 RX ORDER — LIDOCAINE HYDROCHLORIDE 10 MG/ML
0.5 INJECTION, SOLUTION INFILTRATION; PERINEURAL
Status: COMPLETED | OUTPATIENT
Start: 2021-09-01 | End: 2021-09-01

## 2021-09-01 RX ORDER — LIDOCAINE HYDROCHLORIDE 10 MG/ML
1 INJECTION, SOLUTION INFILTRATION; PERINEURAL
Status: COMPLETED | OUTPATIENT
Start: 2021-09-01 | End: 2021-09-01

## 2021-09-01 RX ADMIN — TRIAMCINOLONE ACETONIDE 20 MG: 40 INJECTION, SUSPENSION INTRA-ARTICULAR; INTRAMUSCULAR at 10:22

## 2021-09-01 RX ADMIN — LIDOCAINE HYDROCHLORIDE 0.5 ML: 10 INJECTION, SOLUTION INFILTRATION; PERINEURAL at 10:22

## 2021-09-01 RX ADMIN — LIDOCAINE HYDROCHLORIDE 1 ML: 10 INJECTION, SOLUTION INFILTRATION; PERINEURAL at 10:23

## 2021-09-01 RX ADMIN — TRIAMCINOLONE ACETONIDE 40 MG: 40 INJECTION, SUSPENSION INTRA-ARTICULAR; INTRAMUSCULAR at 10:23

## 2021-09-01 NOTE — PROGRESS NOTES
Subjective   Patient ID: Andrea Woody is a 66 y.o. right hand dominant female  Initial Evaluation of the Right Shoulder ([facility].  Denies injury, states she did a lot of over shoulder work and heavy lifting througtout her life. Worse with abduction. [facility].)         History of Present Illness  Patient presents with right shoulder pain ongoing X 2 years. Denies injury or trauma.   Denies numbness or tingling to RUE.   Patient has tried meloxicam and norco without improvement.   SHe believes she [facility] a [facility] recently from [facility] but doesn't have that with her today.,  Pain Score: 5  Pain Location: Shoulder  Pain Orientation: Right        Pain Frequency: Constant/continuous           Aggravating Factors: Other (Comment) (abduction)                   Past Medical History:   Diagnosis Date   • Arthritis    • Chronic back pain    • Eczema    • Hypertension    • Insomnia         Past Surgical History:   Procedure Laterality Date   • COLONOSCOPY     • HYSTERECTOMY     • MEDIAL BRANCH BLOCK Bilateral 9/17/2019    Procedure: LUMBAR MEDIAL BRANCH BLOCK BILATERAL  3 LEVELS L3/4, L4/5, L5/S1;  Surgeon: Qamar Benitez DO;  Location: [facility] ENDOSCOPY;  Service: Pain Management   • MEDIAL BRANCH BLOCK Bilateral 10/15/2019    Procedure: LUMBAR MEDIAL BRANCH BLOCK BIlateral L3/4 L4/5, L5/S1;  Surgeon: Qamar Benitez DO;  Location: [facility] OR;  Service: Pain Management   • RADIOFREQUENCY ABLATION Left 1/7/2020    Procedure: RADIOFREQUENCY ABLATION LUMBAR L3-S1 RIGHT;  Surgeon: Qamar Benitez DO;  Location: [facility] OR;  Service: Pain Management       Family History   Problem Relation Age of Onset   • Diabetes Mother    • Kidney disease Mother    • Arthritis Mother        Social History     Socioeconomic History   • Marital status:      Spouse name: Not on file   • Number of children: Not on file   • Years of education: Not on file   • Highest  "education level: Not on file   Tobacco Use   • Smoking status: Never Smoker   • Smokeless tobacco: Never Used   Substance and Sexual Activity   • Alcohol use: Yes     Comment: occassional   • Drug use: No   • Sexual activity: Defer         Current Outpatient Medications:   •  hydroCHLOROthiazide (MICROZIDE) 12.5 MG capsule, Take 1 capsule by mouth Daily., Disp: 90 capsule, Rfl: 3  •  HYDROcodone-acetaminophen (NORCO) 5-325 MG per tablet, Take 1 tablet by mouth Daily As Needed for Severe Pain ., Disp: 20 tablet, Rfl: 0  •  meloxicam (MOBIC) 15 MG tablet, Take 1 tablet by mouth Daily., Disp: 90 tablet, Rfl: 3    No Known Allergies    Review of Systems   Constitutional: Negative for diaphoresis, fever and unexpected weight change.   HENT: Negative for dental problem and sore throat.    Eyes: Negative for visual disturbance.   Respiratory: Negative for shortness of breath.    Cardiovascular: Negative for chest pain.   Gastrointestinal: Negative for abdominal pain, constipation, diarrhea, nausea and vomiting.   Genitourinary: Negative for difficulty urinating and frequency.   Musculoskeletal: Positive for arthralgias.   Neurological: Negative for headaches.   Hematological: Does not bruise/bleed easily.   All other systems reviewed and are negative.      I have reviewed the medical and surgical history, family history, social history, medications, and/or allergies, and the review of systems of this report.    Objective   Temp 97 °F (36.1 °C)   Ht 175.3 cm (69\")   Wt 74.8 kg (164 lb 12.8 oz)   LMP  (LMP Unknown)   BMI 24.34 kg/m²    Physical Exam  Vitals and nursing note reviewed.   Constitutional:       Appearance: Normal appearance.   Cardiovascular:      Pulses: Normal pulses.   Pulmonary:      Effort: Pulmonary effort is normal.   Musculoskeletal:      Right shoulder: Tenderness, bony tenderness (ac joint) and crepitus present. Normal strength.   Neurological:      Mental Status: She is alert and oriented to " person, place, and time.   Psychiatric:         Behavior: Behavior normal.       Right Shoulder Exam     Tenderness   The patient is experiencing tenderness in the acromioclavicular joint and acromion.    Range of Motion   Active abduction: 120   Passive abduction: 140   Forward flexion: 130   Internal rotation 0 degrees: Lumbar   Internal rotation 90 degrees: 60     Tests   Cross arm: positive  Impingement: positive  Drop arm: negative    Other   Sensation: normal  Pulse: present           Extremity DVT signs are negative by clinical screen.   Neurologic Exam     Mental Status   Oriented to person, place, and time.        + neer sign RUE      Assessment/Plan   Independent Review of Radiographic Studies:    Xray of right shoulder in office for eval of pain. NO comparison views available to review. NO acute fx. There is AC joint arthropathy        Medium Joint Arthrocentesis: R acromioclavicular  Date/Time: 9/1/2021 10:22 AM  Consent given by: patient  Site marked: site marked  Timeout: Immediately prior to procedure a time out was called to verify the correct patient, procedure, equipment, support staff and site/side marked as required   Supporting Documentation  Indications: pain   Procedure Details  Location: shoulder - R acromioclavicular  Preparation: Patient was prepped and draped in the usual sterile fashion  Needle size: 25 G  Approach: superior  Medications administered: 20 mg triamcinolone acetonide 40 MG/ML; 0.5 mL lidocaine 1 %  Patient tolerance: patient tolerated the procedure well with no immediate complications    Large Joint Arthrocentesis: R subacromial bursa  Date/Time: 9/1/2021 10:23 AM  Consent given by: patient  Site marked: site marked  Timeout: Immediately prior to procedure a time out was called to verify the correct patient, procedure, equipment, support staff and site/side marked as required   Supporting Documentation  Indications: pain   Procedure Details  Location: shoulder - R  subacromial bursa  Preparation: Patient was prepped and draped in the usual sterile fashion  Needle size: 22 G  Approach: posterior  Medications administered: 40 mg triamcinolone acetonide 40 MG/ML; 1 mL lidocaine 1 %  Patient tolerance: patient tolerated the procedure well with no immediate complications             Diagnoses and all orders for this visit:    1. Arthralgia of right shoulder region (Primary)  -     XR Shoulder 2+ View Right; Future  -     Ambulatory Referral to Physical Therapy Evaluate and treat, Ortho; Heat, Electrotherapy; Stretching, ROM, Strengthening    2. AC joint arthropathy  -     Ambulatory Referral to Physical Therapy Evaluate and treat, Ortho; Heat, Electrotherapy; Stretching, ROM, Strengthening    3. Primary osteoarthritis of right shoulder  -     Ambulatory Referral to Physical Therapy Evaluate and treat, Ortho; Heat, Electrotherapy; Stretching, ROM, Strengthening    4. Rotator cuff arthropathy of right shoulder  -     Ambulatory Referral to Physical Therapy Evaluate and treat, Ortho; Heat, Electrotherapy; Stretching, ROM, Strengthening    5. Subacromial bursitis of right shoulder joint    Other orders  -     Medium Joint Arthrocentesis: R acromioclavicular  -     Large Joint Arthrocentesis: R subacromial bursa       Regular exercise as tolerated  Orthopedic activities reviewed and patient expressed appreciation  Discussion of orthopedic goals  Risk, benefits, and merits of treatment alternatives reviewed with the patient and questions answered  Call or notify for any adverse effect from injection therapy    Recommendations/Plan:  Exercise, medications, injections, other patient advice, and return appointment as noted.  Patient is encouraged to call or return for any issues or concerns.    Follow-up in 3 months or as needed.  Patient agreeable to call or return sooner for any concerns.             EMR Dragon-transcription disclaimer:  This encounter note is an electronic transcription  of spoken language to printed text.  Electronic transcription of spoken language may permit erroneous or at times nonsensical words or phrases to be inadvertently transcribed.  Although I have reviewed the note for such errors, some may still exist

## 2021-09-10 ENCOUNTER — PATIENT ROUNDING (BHMG ONLY) (OUTPATIENT)
Dept: ORTHOPEDIC SURGERY | Facility: CLINIC | Age: 66
End: 2021-09-10

## 2021-09-10 ENCOUNTER — TELEPHONE (OUTPATIENT)
Dept: ORTHOPEDIC SURGERY | Facility: CLINIC | Age: 66
End: 2021-09-10

## 2021-09-10 NOTE — PROGRESS NOTES
"September 10, 2021    Hello, may I speak with Andrea Woody?    My name is Katheryn Conde     [facility]  [address] [place] [id number] [phone].    Before we get started may I verify your date of birth? 1955    I am calling to officially welcome you to our practice and ask about your recent visit. Is this a good time to talk? yes    Tell me about your visit with us. What things went well?  \"Very nice people, was a great office\"       We're always looking for ways to make our patients' experiences even better. Do you have recommendations on ways we may improve?  no    Overall were you satisfied with your first visit to our practice? yes       I appreciate you taking the time to speak with me today. Is there anything else I can do for you?    Yes ,\" Had a cortisone injection in my right shoulder in the orthopedic office and when I got home, I had a rash and itching on my L breast. Went to [facility] and was given PO benadryl which helped the rash. They were'nt sure what if it was due to the injection or not, but the injection helped my pain\".  Told patient I would let the office know.      Thank you, and have a great day.      "

## 2021-09-10 NOTE — TELEPHONE ENCOUNTER
"Patient comment from rounding.  I appreciate you taking the time to speak with me today. Is there anything else I can do for you?    Yes ,\" Had a cortisone injection in my right shoulder in the orthopedic office and when I got home, I had a rash and itching on my L breast. Went to [facility] and was given PO benadryl which helped the rash. They were'nt sure what if it was due to the injection or not, but the injection helped my pain\".  Told patient I would let the office know.      "

## 2021-11-01 ENCOUNTER — TELEPHONE (OUTPATIENT)
Dept: ORTHOPEDIC SURGERY | Facility: CLINIC | Age: 66
End: 2021-11-01

## 2021-11-01 NOTE — TELEPHONE ENCOUNTER
Caller: DEEPAK     Relationship: [employer]    Best call back number: [phone]    What form or medical record are you requesting: LAST OFFICE NOTES    Who [facility] this form or [facility] you[facility]    How would you like to receive the form or medical records (pick-up, mail, fax): FAX  If fax, what is the fax number: [phone]

## 2022-02-28 ENCOUNTER — TRANSCRIBE ORDERS (OUTPATIENT)
Dept: ADMINISTRATIVE | Facility: HOSPITAL | Age: 67
End: 2022-02-28

## 2022-02-28 DIAGNOSIS — Z12.31 VISIT FOR SCREENING MAMMOGRAM: Primary | ICD-10-CM

## 2022-07-18 ENCOUNTER — HOSPITAL ENCOUNTER (OUTPATIENT)
Dept: MAMMOGRAPHY | Facility: HOSPITAL | Age: 67
Discharge: HOME | End: 2022-07-18
Admitting: INTERNAL MEDICINE

## 2022-07-18 DIAGNOSIS — Z12.31 VISIT FOR SCREENING MAMMOGRAM: ICD-10-CM

## 2022-07-18 PROCEDURE — 77063 BREAST TOMOSYNTHESIS BI: CPT

## 2022-07-18 PROCEDURE — 77067 SCR MAMMO BI INCL CAD: CPT

## 2024-02-16 NOTE — OP NOTE
Informed consent was obtained after a discussion of risks, benefits and alternatives to the procedure. Patient agreed to proceed by signing the procedure consent. The patient was placed in a prone position on the fluoroscopy table. Blood pressure, heart rate, and pulse oximetry were monitored throughout the procedure.     Skin overlying the lumbar region was cleansed with ethyl alcohol and chlorahexidine solution.  Next using A/P fluoroscopic views a 25-gauge by 3 1/2 inch spinal needle was advanced to make contact with notch of the sacral ala.  Following this, under fluoroscopic views needles were advanced into the junction of the superior articular process and transverse process at the L3, L4 and L5 levels on left sides and positioned with the needle tips just posterior to a line connecting the neuroforaminal space.  Each level was then injected with a mixture of Methylprednisolone and 1cc of 0.5% Bupivicaine.  The needles were then removed intact.  The procedure was then repeated on the right in a similar manner with the same injectate.  Procedure was repeated on the contralateral side in the same manner. 80mg total of methylprednisolone were used.       The patient was monitored for adverse allergic, hemodynamic, or neurologic symptoms after the procedure. The patient tolerated the procedure well and there were no apparent complications. Vital signs remained stable throughout the procedure. The patient was taken to the recovery area where written discharge instructions for the procedure were given. The patient was discharged home.    NOTE: Universal Sterile Protocol was observed throughout the entire procedure and this patient has been educated prior to the performance of this procedure.      Qamar Benitez,   09/17/19     Principal Discharge DX:	Syncope   1

## (undated) DEVICE — NDL SPINE 25G 31/2IN BLU

## (undated) DEVICE — NDL HYPO ECLPS SFTY 25G 1 1/2IN

## (undated) DEVICE — GLV SURG SENSICARE MICRO PF LF 8 STRL

## (undated) DEVICE — PAD GRND RF ABL DISP

## (undated) DEVICE — SYR LL TP 10ML STRL

## (undated) DEVICE — NDL BLNT 18G 1 1/2IN

## (undated) DEVICE — APPL CHG2PCT ALC70PCT 3ML ORNG

## (undated) DEVICE — GLV SURG SENSICARE W/ALOE PF LF 8 STRL

## (undated) DEVICE — NDL HYPO ECLPS SFTY 18G 1 1/2IN

## (undated) DEVICE — APPL CHLORAPREP W/TINT 10.5ML ORNG

## (undated) DEVICE — ELECTRD RF ABL 10CM DISP

## (undated) DEVICE — SYR LUERLOK 5CC

## (undated) DEVICE — Device

## (undated) DEVICE — SPNG GZ WOVN 4X4IN 12PLY 10/BX STRL

## (undated) DEVICE — BNDG ADHS CURAD FLX/FABRC 1X3IN STRL LF

## (undated) DEVICE — SYR LL 3CC

## (undated) DEVICE — BNDG ADHS PLSTC 1X3IN LF